# Patient Record
Sex: MALE | Race: WHITE
[De-identification: names, ages, dates, MRNs, and addresses within clinical notes are randomized per-mention and may not be internally consistent; named-entity substitution may affect disease eponyms.]

---

## 2017-10-22 ENCOUNTER — HOSPITAL ENCOUNTER (INPATIENT)
Dept: HOSPITAL 80 - F1N | Age: 57
LOS: 1 days | Discharge: LEFT BEFORE DISCHARGE | DRG: 848 | End: 2017-10-23
Attending: INTERNAL MEDICINE | Admitting: INTERNAL MEDICINE
Payer: MEDICAID

## 2017-10-22 DIAGNOSIS — Z51.11: Primary | ICD-10-CM

## 2017-10-23 ENCOUNTER — HOSPITAL ENCOUNTER (INPATIENT)
Dept: HOSPITAL 80 - F1N | Age: 57
LOS: 6 days | Discharge: HOME | DRG: 847 | End: 2017-10-29
Attending: INTERNAL MEDICINE | Admitting: HOSPITALIST
Payer: COMMERCIAL

## 2017-10-23 DIAGNOSIS — G89.29: ICD-10-CM

## 2017-10-23 DIAGNOSIS — G62.9: ICD-10-CM

## 2017-10-23 DIAGNOSIS — Z88.0: ICD-10-CM

## 2017-10-23 DIAGNOSIS — G47.00: ICD-10-CM

## 2017-10-23 DIAGNOSIS — C21.0: ICD-10-CM

## 2017-10-23 DIAGNOSIS — Z51.11: Primary | ICD-10-CM

## 2017-10-23 DIAGNOSIS — Z59.0: ICD-10-CM

## 2017-10-23 DIAGNOSIS — F10.10: ICD-10-CM

## 2017-10-23 DIAGNOSIS — R74.0: ICD-10-CM

## 2017-10-23 DIAGNOSIS — Z87.820: ICD-10-CM

## 2017-10-23 DIAGNOSIS — F17.210: ICD-10-CM

## 2017-10-23 DIAGNOSIS — I10: ICD-10-CM

## 2017-10-23 DIAGNOSIS — Z91.19: ICD-10-CM

## 2017-10-23 LAB
ALBUMIN SERPL-MCNC: 4.2 G/DL (ref 3.5–5)
ALP SERPL-CCNC: 62 IU/L (ref 38–126)
ALT SERPL-CCNC: 88 IU/L (ref 21–72)
ANION GAP SERPL CALC-SCNC: 13 MEQ/L (ref 8–16)
AST SERPL-CCNC: 88 IU/L (ref 17–59)
BILIRUB SERPL-MCNC: 0.5 MG/DL (ref 0.1–1.4)
CALCIUM SERPL-MCNC: 8.7 MG/DL (ref 8.5–10.4)
CHLORIDE SERPL-SCNC: 102 MEQ/L (ref 97–110)
CO2 SERPL-SCNC: 26 MEQ/L (ref 22–31)
CREAT SERPL-MCNC: 0.9 MG/DL (ref 0.7–1.3)
ETHANOL SERPL-MCNC: 278 MG/DL (ref 0–10)
GFR SERPL CREATININE-BSD FRML MDRD: > 60 ML/MIN/{1.73_M2}
GLUCOSE SERPL-MCNC: 106 MG/DL (ref 70–100)
INR PPP: 1.02 (ref 0.83–1.16)
POTASSIUM SERPL-SCNC: 3.3 MEQ/L (ref 3.5–5.2)
PROT SERPL-MCNC: 7.1 G/DL (ref 6.3–8.2)
PROTHROMBIN TIME: 13.3 SEC (ref 12–15)
SODIUM SERPL-SCNC: 141 MEQ/L (ref 134–144)

## 2017-10-23 PROCEDURE — C1751 CATH, INF, PER/CENT/MIDLINE: HCPCS

## 2017-10-23 PROCEDURE — G0480 DRUG TEST DEF 1-7 CLASSES: HCPCS

## 2017-10-23 PROCEDURE — 3E03305 INTRODUCTION OF OTHER ANTINEOPLASTIC INTO PERIPHERAL VEIN, PERCUTANEOUS APPROACH: ICD-10-PCS | Performed by: INTERNAL MEDICINE

## 2017-10-23 SDOH — ECONOMIC STABILITY - HOUSING INSECURITY: HOMELESSNESS: Z59.0

## 2017-10-23 NOTE — PDGENHP
History and Physical





- Chief Complaint


here for chemo





- History of Present Illness


56 yo male with h/o metastatic anal cancer presents to hospital for direct 

admission for chemotherapy.  He initially noticed a right groin lump in 3/2017.

  At that time he was incarcerated.  He ultimately underwent biopsy of this node

, which showed squamous cell carcinoma possibly consistent with anal cancer 

origin.  At PET/CT done at [facility] showed increased uptake along the right wall of 

the anus as well as the right inguinal lymph region.  He is admitted for PICC 

line placement and initiation of chemotherapy.  He denies CP, SOB, abdominal 

pain, N/V or diarrhea.  No fevers or chills.  He drinks a pint of whiskey a day

, but denies any h/o withdrawal syndrome or seizures.  He appears substance 

affected during my interview.  He initially presented to the med surg unit 

earlier today, then left AMA for 3-4 hrs and just returned.  He now agrees to 

stay for the duration of his chemo course.  He is emotional regarding his 

diagnosis. 





History Information





- Allergies/Home Medication List


Allergies/Adverse Reactions: 








Penicillins Allergy (Intermediate, Verified 10/29/16 23:24)


 Rash





Home Medications: 








Lidocaine 5% [Lidoderm 5% Patch (*)] 1 ea TD DAILY 08/11/15 [Last Taken 10/23/17

]


Terazosin HCl [Hytrin 2 MG (*)] 2 mg PO HS 08/11/15 [Last Taken 10/22/17]


traMADol [Ultram 50 mg (*)] 50 - 100 mg PO Q8 PRN 08/11/15 [Last Taken Unknown]


traZODone [traZODONE 100MG (*)] 200 mg PO HS 08/11/15 [Last Taken 10/22/17]


Baclofen [Baclofen 10 mg (*)] 10 mg PO HS 10/23/17 [Last Taken 10/22/17]


Fluticasone Nasal [Flonase Nasal Spray (RX)] 2 sprays NASAL DAILY 10/23/17 [

Last Taken 10/23/17]


Gabapentin [Neurontin 400 MG (*)] 800 mg PO TID 10/23/17 [Last Taken 10/23/17]


Ibuprofen [Motrin (*)] 600 mg PO TIDMEAL 10/23/17 [Last Taken 10/23/17]


Losartan Potassium [Cozaar 50 mg (*)] 50 mg PO DAILY 10/23/17 [Last Taken 10/23/

17]


Omeprazole [Prilosec 20 mg] 40 mg PO DAILY 10/23/17 [Last Taken 10/23/17]


oxyCODONE IR [Oxycodone Ir (*)] 5 mg PO BID PRN 10/23/17 [Last Taken Unknown]





I have personally reviewed and updated: family history, medical history, social 

history, surgical history





- Past Medical History


Additional medical history: alcohol abuse, tobacco abuse, hypertension, 

neuropathy, traumatic ICH 2009, insomnia





- Surgical History


Additional surgical history: right shoulder surgery 1986.  metal plate right 

finger





- Family History


Positive for: non-pertinent





- Social History


Smoking Status: Current every day smoker


Tobacco Use: Greater than 1 pack/day


Alcohol Use: Heavy


Additional social history: Homeless.  Drinks pint of YOOSEey a day.  

Denies w/d or seizure hx.





Review of Systems


Review of Systems: 





ROS: 10pt was reviewed & negative except for what was stated in HPI & below





Physical Exam


Physical Exam: 

















Temp Pulse Resp BP Pulse Ox


 


 36.4 C   104 H  20   130/81 H  94 


 


 10/23/17 18:47  10/23/17 18:47  10/23/17 18:47  10/23/17 18:47  10/23/17 18:47











Constitutional: no apparent distress


Eyes: PERRL


Ears, Nose, Mouth, Throat: moist mucous membranes


Cardiovascular: regular rate and rhythym, no murmur, rub, or gallop


Respiratory: no respiratory distress, clear to auscultation


Gastrointestinal: normoactive bowel sounds, soft, non-tender abdomen


Genitourinary: other (right groin LAD with palpable node ~2x3 cm)


Skin: warm


Musculoskeletal: full muscle strength


Psychiatric: poor judgement





Lab Data & Imaging Review





 10/23/17 20:30





 10/23/17 20:30





Assessment & Plan


Assessment: 





56 yo male admitted for chemo 





Metastatic anal cancer - SCC on biopsy of right groin node, suspected source is 

anal thickening seen on PET/CT.  Plan is admitted for chemo, followed by Dr. Gómez at [facility].  Discussed with pt reluctance to place a PICC given he 

already left AMA once today.  He agrees to stay for duration of chemo course, 

but I note he appears substance affected at this time. 


   -defer PICC until tomorrow AM, need to consent pt to this when his mentation 

is more clear


   -chemo per oncology





Alcohol use disorder - po vodka ordered to avoid withdrawal syndrome in this 

patient who does not intend to maintain sobriety.





Tobacco use disorder - pt declined nicoderm patch





Full code





DVT PPLX - Lovenox





Dispo - inpt, will require >48 hrs hospitalization for chemo

## 2017-10-24 LAB
% IMMATURE GRANULYOCYTES: 0.2 % (ref 0–1.1)
ABSOLUTE IMMATURE GRANULOCYTES: 0.01 10^3/UL (ref 0–0.1)
ABSOLUTE NRBC COUNT: 0 10^3/UL (ref 0–0.01)
ADD DIFF?: NO
ADD MORPH?: NO
ADD SCAN?: NO
ATYPICAL LYMPHOCYTE FLAG: 30 (ref 0–99)
ERYTHROCYTE [DISTWIDTH] IN BLOOD BY AUTOMATED COUNT: 13.2 % (ref 11.5–15.2)
FRAGMENT RBC FLAG: 0 (ref 0–99)
HCT VFR BLD CALC: 40.5 % (ref 40–51)
HGB BLD-MCNC: 14.4 G/DL (ref 13.7–17.5)
LEFT SHIFT FLG: 0 (ref 0–99)
LIPEMIA HEMOLYSIS FLAG: 90 (ref 0–99)
MCH RBC BLDCO QN: 35.5 PG (ref 27.9–34.1)
MCHC RBC AUTO-ENTMCNC: 35.6 G/DL (ref 32.4–36.7)
MCV RBC AUTO: 99.8 FL (ref 81.5–99.8)
NRBC-AUTO%: 0 % (ref 0–0.2)
PHENCYCLIDINE URINE BCH: < 6 NG/ML
PLATELET # BLD: 128 10^3/UL (ref 150–400)
PLATELET CLUMPS FLAG: 20 (ref 0–99)
PMV BLD AUTO: 9.6 FL (ref 8.7–11.7)
RBC # BLD AUTO: 4.06 10^6/UL (ref 4.4–6.38)
TETRAHYDROCANNABINOL URINE: NEGATIVE NG/ML

## 2017-10-24 PROCEDURE — 02HV33Z INSERTION OF INFUSION DEVICE INTO SUPERIOR VENA CAVA, PERCUTANEOUS APPROACH: ICD-10-PCS | Performed by: RADIOLOGY

## 2017-10-24 RX ADMIN — Medication SCH MG: at 09:36

## 2017-10-24 RX ADMIN — GABAPENTIN SCH MG: 400 CAPSULE ORAL at 12:00

## 2017-10-24 RX ADMIN — BACLOFEN PRN MG: 10 TABLET ORAL at 09:39

## 2017-10-24 RX ADMIN — OXYCODONE HYDROCHLORIDE PRN MG: 15 TABLET ORAL at 09:36

## 2017-10-24 RX ADMIN — TERAZOSIN HYDROCHLORIDE ANHYDROUS SCH MG: 2 CAPSULE ORAL at 21:08

## 2017-10-24 RX ADMIN — PANTOPRAZOLE SODIUM SCH MG: 40 TABLET, DELAYED RELEASE ORAL at 21:08

## 2017-10-24 RX ADMIN — Medication SCH ML: at 18:04

## 2017-10-24 RX ADMIN — LIDOCAINE SCH EA: 50 PATCH TOPICAL at 21:08

## 2017-10-24 RX ADMIN — BACLOFEN SCH MG: 10 TABLET ORAL at 21:09

## 2017-10-24 RX ADMIN — LOSARTAN POTASSIUM SCH MG: 50 TABLET, FILM COATED ORAL at 12:02

## 2017-10-24 RX ADMIN — FLUTICASONE PROPIONATE SCH: 50 SPRAY, METERED NASAL at 08:32

## 2017-10-24 RX ADMIN — GABAPENTIN SCH MG: 400 CAPSULE ORAL at 21:09

## 2017-10-24 NOTE — HOSPPROG
Hospitalist Progress Note


Assessment/Plan: 





# anal SCC 


   - plan mitomycin, then 4 days of 5-FU


   - XRT daily - patient very resistant to taking an ambulance; I called risk 

management


   - PICC today


# etOH abuse 


   - vodka daily, no plans for cessation


   - thiamine


# tobacco use - declined nicotine patch


# htn - losartan, terazosin


# neuropathy - gabapentin


# chronic pain - baclofen, oxy


# hx traumatic ICH 2009


# lovenox





Subjective: very resistant to taking an ambulance to radiation; sleeping, 

complains of awakening in the middle of the night


Objective: 


 Vital Signs











Temp Pulse Resp BP Pulse Ox


 


 36.8 C   92   16   131/84 H  95 


 


 10/23/17 20:00  10/23/17 20:00  10/23/17 20:00  10/23/17 20:00  10/23/17 20:00








 Laboratory Results





 10/24/17 04:45 





 10/23/17 20:30 





 











 10/23/17 10/24/17 10/25/17





 05:59 05:59 05:59


 


Intake Total  400 


 


Output Total  400 


 


Balance  0 








 











PT  13.3 SEC (12.0-15.0)   10/23/17  20:30    


 


INR  1.02  (0.83-1.16)   10/23/17  20:30    








chart reviewed


PET CT reviewed





- Physical Exam


Constitutional: unkempt


Cardiovascular: regular rate and rhythym, no murmur, rub, or gallop


Respiratory: no respiratory distress, no rales or rhonchi, clear to auscultation


Gastrointestinal: normoactive bowel sounds, soft, non-tender abdomen, no 

palpable masses





ICD10 Worksheet


Patient Problems: 


 Problems











Problem Status Onset


 


Cellulitis and abscess Acute

## 2017-10-24 NOTE — ASMTCMCOM
CM Note

 

CM Note                       

Notes:

Patient twice admitted yesterday after deciding to leave shortly after admission. Patient here for 

chemo and radiation for rectal cancer. Patient to get PICC today. Left message for Brianne Naranjo to 

see patient to establish a base line of behavior or necessary boundaries. Patient is refusing 

nicotine patch and also states he plans to continue drinking. Case management will continue to 

follow.

 

Date Signed:  10/24/2017 10:07 AM

Electronically Signed By:JOSE RAMON Davidson

## 2017-10-24 NOTE — ASMTCMCOM
CM Note

 

CM Note                       

Notes:

Requested that Martha Barrios meet with patient because he was very concerned that he would not 

receive assistance with housing after his treatment though he had been told that this would be 

arranged. Martha spent a considerable amount to time with patient and patient appeared to trust her 

with some very personal information. He also referred to important people in his life Francesca , his 

sister [phone], Brother Zak [phone], and friend Alex [phone]. He also told Martha he woul 

be willing to get a nicotin patch while he is here and said he would take the vodka if it is in 

cranberry juice. Martha plans to continue to touch base with this patient and address needs not 

anticipated as they arise. Case management will continue to follow. 

 

Date Signed:  10/24/2017 06:04 PM

Electronically Signed By:JOSE RAMON Davidson

## 2017-10-25 RX ADMIN — LOSARTAN POTASSIUM SCH MG: 50 TABLET, FILM COATED ORAL at 12:02

## 2017-10-25 RX ADMIN — BACLOFEN PRN MG: 10 TABLET ORAL at 05:52

## 2017-10-25 RX ADMIN — BACLOFEN SCH MG: 10 TABLET ORAL at 22:37

## 2017-10-25 RX ADMIN — OXYCODONE HYDROCHLORIDE PRN MG: 15 TABLET ORAL at 08:48

## 2017-10-25 RX ADMIN — Medication SCH ML: at 08:48

## 2017-10-25 RX ADMIN — PANTOPRAZOLE SODIUM SCH MG: 40 TABLET, DELAYED RELEASE ORAL at 21:07

## 2017-10-25 RX ADMIN — GABAPENTIN SCH MG: 400 CAPSULE ORAL at 21:08

## 2017-10-25 RX ADMIN — GABAPENTIN SCH MG: 400 CAPSULE ORAL at 12:03

## 2017-10-25 RX ADMIN — LIDOCAINE SCH EA: 50 PATCH TOPICAL at 21:07

## 2017-10-25 RX ADMIN — TERAZOSIN HYDROCHLORIDE ANHYDROUS SCH MG: 2 CAPSULE ORAL at 22:37

## 2017-10-25 RX ADMIN — FLUTICASONE PROPIONATE SCH: 50 SPRAY, METERED NASAL at 10:17

## 2017-10-25 RX ADMIN — Medication SCH MG: at 08:48

## 2017-10-25 RX ADMIN — ENOXAPARIN SODIUM SCH MG: 100 INJECTION SUBCUTANEOUS at 08:52

## 2017-10-25 RX ADMIN — GABAPENTIN SCH MG: 400 CAPSULE ORAL at 05:50

## 2017-10-25 NOTE — HOSPPROG
Hospitalist Progress Note


Assessment/Plan: 








# anal SCC 


   - plan mitomycin, then 4 days of 5-FU - likely complete Sunday am


   - XRT daily - ok'd for him to take wheelchair


   - PICC today


# etOH abuse - no evidence of w/d


   - vodka daily, no plans for cessation


   - ativan prn


   - thiamine


# tobacco use - nicotine prn


# htn - losartan, terazosin, start low dose norvasc


# neuropathy - gabapentin


# chronic pain - baclofen, oxy


# hx traumatic ICH 2009


# lovenox





Subjective: s/p PICC and XRT yesterday


Objective: 


 Vital Signs











Temp Pulse Resp BP Pulse Ox


 


 36.4 C   91   20   166/105 H  93 


 


 10/25/17 09:35  10/25/17 09:35  10/25/17 09:35  10/25/17 09:35  10/25/17 09:35








 Laboratory Results





 10/24/17 04:45 





 10/23/17 20:30 





 











 10/24/17 10/25/17 10/26/17





 05:59 05:59 05:59


 


Intake Total 400 200 


 


Output Total 400  


 


Balance 0 200 








 











PT  13.3 SEC (12.0-15.0)   10/23/17  20:30    


 


INR  1.02  (0.83-1.16)   10/23/17  20:30    














- Physical Exam


Constitutional: no apparent distress, appears nourished


Cardiovascular: regular rate and rhythym, no murmur, rub, or gallop, systolic 

murmur


Respiratory: no respiratory distress, no rales or rhonchi, clear to auscultation


Gastrointestinal: normoactive bowel sounds, soft, non-tender abdomen, no 

palpable masses





ICD10 Worksheet


Patient Problems: 


 Problems











Problem Status Onset


 


Cellulitis and abscess Acute

## 2017-10-25 NOTE — ASMTCMCOM
CM Note

 

CM Note                       

Notes:

ULTC-100 started, at front of chart. CM will finish in AM. Ines notified for LT M'Caid katlin to be 

atarted as pt may need SNF placement at [place].

 

Date Signed:  10/25/2017 05:02 PM

Electronically Signed By:Alejandra Thomason RN

## 2017-10-25 NOTE — SOAPPROG
SOAP Progress Note


Assessment/Plan: 


Assessment:


1) Stage IIIB anal canal SCC


2) Alcohol abuse


3) Mild transaminitis





























Plan:


Ray had a PICC line placed yesterday. He is has started Mitomycin / 5-FU 

yesterday. So far he has had no side effects.


He continues on daily XRT.


Will repeat a CMP in am tomorrow.


Case management arranging housing once he is discharged.


His questions were answered.





10/25/17 14:21





Subjective: 





Feels well. Denies N/V.


Denies diarrhea or mouth sores.


Objective: 





 Vital Signs











Temp Pulse Resp BP Pulse Ox


 


 36.4 C   107 H  14   136/104 H  93 


 


 10/25/17 09:35  10/25/17 12:01  10/25/17 12:01  10/25/17 12:01  10/25/17 12:01








 Laboratory Results





 10/24/17 04:45 





 10/23/17 20:30 





 











 10/24/17 10/25/17 10/26/17





 05:59 05:59 05:59


 


Intake Total 400 200 


 


Output Total 400  


 


Balance 0 200 








 











PT  13.3 SEC (12.0-15.0)   10/23/17  20:30    


 


INR  1.02  (0.83-1.16)   10/23/17  20:30    














- Time Spent With Patient


Time Spent With Patient: 





20 minutes





Physical Exam





- Physical Exam


General Appearance: alert, no apparent distress


EENT: PERRL/EOMI


Skin: normal color


Neuro/Psych: normal mood/affect





ICD10 Worksheet


Patient Problems: 


 Problems











Problem Status Onset


 


Cellulitis and abscess Acute

## 2017-10-26 LAB
% IMMATURE GRANULYOCYTES: 0.2 % (ref 0–1.1)
ABSOLUTE IMMATURE GRANULOCYTES: 0.01 10^3/UL (ref 0–0.1)
ABSOLUTE NRBC COUNT: 0 10^3/UL (ref 0–0.01)
ADD DIFF?: NO
ADD MORPH?: NO
ADD SCAN?: NO
ALBUMIN SERPL-MCNC: 3.9 G/DL (ref 3.5–5)
ALP SERPL-CCNC: 56 IU/L (ref 38–126)
ALT SERPL-CCNC: 63 IU/L (ref 21–72)
ANION GAP SERPL CALC-SCNC: 11 MEQ/L (ref 8–16)
AST SERPL-CCNC: 51 IU/L (ref 17–59)
ATYPICAL LYMPHOCYTE FLAG: 0 (ref 0–99)
BILIRUB SERPL-MCNC: 1.3 MG/DL (ref 0.1–1.4)
CALCIUM SERPL-MCNC: 9.1 MG/DL (ref 8.5–10.4)
CHLORIDE SERPL-SCNC: 101 MEQ/L (ref 97–110)
CO2 SERPL-SCNC: 29 MEQ/L (ref 22–31)
CREAT SERPL-MCNC: 0.8 MG/DL (ref 0.7–1.3)
ERYTHROCYTE [DISTWIDTH] IN BLOOD BY AUTOMATED COUNT: 12.8 % (ref 11.5–15.2)
FRAGMENT RBC FLAG: 0 (ref 0–99)
GFR SERPL CREATININE-BSD FRML MDRD: > 60 ML/MIN/{1.73_M2}
GLUCOSE SERPL-MCNC: 81 MG/DL (ref 70–100)
HCT VFR BLD CALC: 41.3 % (ref 40–51)
HGB BLD-MCNC: 14.6 G/DL (ref 13.7–17.5)
LEFT SHIFT FLG: 0 (ref 0–99)
LIPEMIA HEMOLYSIS FLAG: 90 (ref 0–99)
MCH RBC BLDCO QN: 36.1 PG (ref 27.9–34.1)
MCHC RBC AUTO-ENTMCNC: 35.4 G/DL (ref 32.4–36.7)
MCV RBC AUTO: 102.2 FL (ref 81.5–99.8)
NRBC-AUTO%: 0 % (ref 0–0.2)
PLATELET # BLD: 124 10^3/UL (ref 150–400)
PLATELET CLUMPS FLAG: 10 (ref 0–99)
PMV BLD AUTO: 9.7 FL (ref 8.7–11.7)
POTASSIUM SERPL-SCNC: 4 MEQ/L (ref 3.5–5.2)
PROT SERPL-MCNC: 7.3 G/DL (ref 6.3–8.2)
RBC # BLD AUTO: 4.04 10^6/UL (ref 4.4–6.38)
SODIUM SERPL-SCNC: 141 MEQ/L (ref 134–144)

## 2017-10-26 RX ADMIN — ENOXAPARIN SODIUM SCH MG: 100 INJECTION SUBCUTANEOUS at 07:48

## 2017-10-26 RX ADMIN — Medication SCH ML: at 07:48

## 2017-10-26 RX ADMIN — GABAPENTIN SCH MG: 400 CAPSULE ORAL at 12:27

## 2017-10-26 RX ADMIN — Medication SCH MG: at 07:48

## 2017-10-26 RX ADMIN — GABAPENTIN SCH MG: 400 CAPSULE ORAL at 21:31

## 2017-10-26 RX ADMIN — TERAZOSIN HYDROCHLORIDE ANHYDROUS SCH MG: 2 CAPSULE ORAL at 21:33

## 2017-10-26 RX ADMIN — PANTOPRAZOLE SODIUM SCH MG: 40 TABLET, DELAYED RELEASE ORAL at 21:34

## 2017-10-26 RX ADMIN — BACLOFEN SCH MG: 10 TABLET ORAL at 21:35

## 2017-10-26 RX ADMIN — GABAPENTIN SCH MG: 400 CAPSULE ORAL at 05:37

## 2017-10-26 RX ADMIN — BACLOFEN PRN MG: 10 TABLET ORAL at 14:00

## 2017-10-26 RX ADMIN — LOSARTAN POTASSIUM SCH MG: 50 TABLET, FILM COATED ORAL at 12:27

## 2017-10-26 RX ADMIN — LIDOCAINE SCH EA: 50 PATCH TOPICAL at 21:31

## 2017-10-26 RX ADMIN — FLUTICASONE PROPIONATE SCH: 50 SPRAY, METERED NASAL at 07:50

## 2017-10-26 RX ADMIN — Medication SCH MLS: at 00:00

## 2017-10-26 NOTE — SOAPPROG
SOAP Progress Note


Assessment/Plan: 


Assessment:


1) Stage IIIB anal canal SCC


2) Alcohol abuse


3) Mild transaminitis





























Plan:


Ray had a PICC line. He is has started Mitomycin / 5-FU on 10/24. So far he 

has had no side effects.


He continues on daily XRT.


Will repeat a CMP today when his 5-FU is stopped for him to go to XRT.


No signs of ETOH withdrawal. He receives vodka as he is not interested in 

sobriety.


Case management arranging housing once he is discharged.


His questions were answered.





10/25/17 14:21





10/26/17 08:48





10/26/17 08:49





Subjective: 





Receiving chemotherapy and daily XRT.


Denies nausea or diarrhea


Objective: 





 Vital Signs











Temp Pulse Resp BP Pulse Ox


 


 36.4 C   79   16   152/107 H  95 


 


 10/26/17 07:26  10/26/17 07:26  10/26/17 07:26  10/26/17 07:47  10/26/17 07:26








 Laboratory Results





 10/24/17 04:45 





 10/23/17 20:30 





 











 10/25/17 10/26/17 10/27/17





 05:59 05:59 05:59


 


Intake Total 200 1832.90 


 


Balance 200 1832.90 








 











PT  13.3 SEC (12.0-15.0)   10/23/17  20:30    


 


INR  1.02  (0.83-1.16)   10/23/17  20:30    














- Time Spent With Patient


Time Spent With Patient: 





20 minutes





Physical Exam





- Physical Exam


General Appearance: alert, no apparent distress


EENT: PERRL/EOMI


Respiratory: lungs clear


Cardiac/Chest: regular rate, rhythm


Abdomen: non-tender, soft


Skin: normal color


Neuro/Psych: alert





ICD10 Worksheet


Patient Problems: 


 Problems











Problem Status Onset


 


Cellulitis and abscess Acute

## 2017-10-26 NOTE — HOSPPROG
Hospitalist Progress Note


Assessment/Plan: 








# anal SCC, admitted for chemo


   - plan mitomycin, then 4 days of 5-FU - likely complete Sunday am


   - XRT daily - ok'd for him to take wheelchair


   - PICC today


# etOH abuse - no evidence of w/d


   - vodka daily, no plans for cessation


   - ativan prn


   - thiamine


# behavioral issues - very resistant to following hospital protocols; makes his 

treatment difficult; had initially signed out AMA, then returned intoxicated


# tobacco use - nicotine prn


# htn - losartan, terazosin, start low dose norvasc


# neuropathy - gabapentin


# chronic pain - baclofen, oxy


# hx traumatic ICH 2009


# lovenox





Subjective: still resistant to following rules such as staying on the unit 

while he is getting chemo


Objective: 


 Vital Signs











Temp Pulse Resp BP Pulse Ox


 


 36.4 C   79   16   152/107 H  95 


 


 10/26/17 07:26  10/26/17 07:26  10/26/17 07:26  10/26/17 07:47  10/26/17 07:26








 Laboratory Results





 10/26/17 09:20 





 10/26/17 09:20 





 











 10/25/17 10/26/17 10/27/17





 05:59 05:59 05:59


 


Intake Total 200 1832.90 


 


Balance 200 1832.90 








 











PT  13.3 SEC (12.0-15.0)   10/23/17  20:30    


 


INR  1.02  (0.83-1.16)   10/23/17  20:30    














- Time Spent With Patient


Time Spent with Patient: greater than 25 minutes


Time Spent with Patient: Greater than 25 minutes spent on this patients care, 

greater than 50% of time spent counseling, educating, and coordinating care 

regarding the above mentioned plan.





- Physical Exam


Constitutional: no apparent distress, appears nourished





ICD10 Worksheet


Patient Problems: 


 Problems











Problem Status Onset


 


Cellulitis and abscess Acute

## 2017-10-27 LAB
% IMMATURE GRANULYOCYTES: 0.2 % (ref 0–1.1)
ABSOLUTE IMMATURE GRANULOCYTES: 0.01 10^3/UL (ref 0–0.1)
ABSOLUTE NRBC COUNT: 0 10^3/UL (ref 0–0.01)
ADD DIFF?: NO
ADD MORPH?: NO
ADD SCAN?: NO
ALBUMIN SERPL-MCNC: 4 G/DL (ref 3.5–5)
ALP SERPL-CCNC: 55 IU/L (ref 38–126)
ALT SERPL-CCNC: 58 IU/L (ref 21–72)
ANION GAP SERPL CALC-SCNC: 13 MEQ/L (ref 8–16)
AST SERPL-CCNC: 50 IU/L (ref 17–59)
ATYPICAL LYMPHOCYTE FLAG: 0 (ref 0–99)
BILIRUB SERPL-MCNC: 1.3 MG/DL (ref 0.1–1.4)
CALCIUM SERPL-MCNC: 9 MG/DL (ref 8.5–10.4)
CHLORIDE SERPL-SCNC: 100 MEQ/L (ref 97–110)
CO2 SERPL-SCNC: 26 MEQ/L (ref 22–31)
CREAT SERPL-MCNC: 0.8 MG/DL (ref 0.7–1.3)
ERYTHROCYTE [DISTWIDTH] IN BLOOD BY AUTOMATED COUNT: 12.9 % (ref 11.5–15.2)
FRAGMENT RBC FLAG: 0 (ref 0–99)
GFR SERPL CREATININE-BSD FRML MDRD: > 60 ML/MIN/{1.73_M2}
GLUCOSE SERPL-MCNC: 99 MG/DL (ref 70–100)
HCT VFR BLD CALC: 39.9 % (ref 40–51)
HGB BLD-MCNC: 14.4 G/DL (ref 13.7–17.5)
LEFT SHIFT FLG: 0 (ref 0–99)
LIPEMIA HEMOLYSIS FLAG: 90 (ref 0–99)
MCH RBC BLDCO QN: 36.9 PG (ref 27.9–34.1)
MCHC RBC AUTO-ENTMCNC: 36.1 G/DL (ref 32.4–36.7)
MCV RBC AUTO: 102.3 FL (ref 81.5–99.8)
NRBC-AUTO%: 0 % (ref 0–0.2)
PLATELET # BLD: 122 10^3/UL (ref 150–400)
PLATELET CLUMPS FLAG: 0 (ref 0–99)
PMV BLD AUTO: 9.2 FL (ref 8.7–11.7)
POTASSIUM SERPL-SCNC: 3.6 MEQ/L (ref 3.5–5.2)
PROT SERPL-MCNC: 6.8 G/DL (ref 6.3–8.2)
RBC # BLD AUTO: 3.9 10^6/UL (ref 4.4–6.38)
SODIUM SERPL-SCNC: 139 MEQ/L (ref 134–144)

## 2017-10-27 RX ADMIN — BACLOFEN SCH MG: 10 TABLET ORAL at 21:17

## 2017-10-27 RX ADMIN — GABAPENTIN SCH MG: 400 CAPSULE ORAL at 21:17

## 2017-10-27 RX ADMIN — OXYCODONE HYDROCHLORIDE PRN MG: 15 TABLET ORAL at 05:08

## 2017-10-27 RX ADMIN — Medication SCH MG: at 08:57

## 2017-10-27 RX ADMIN — GABAPENTIN SCH MG: 400 CAPSULE ORAL at 05:06

## 2017-10-27 RX ADMIN — TERAZOSIN HYDROCHLORIDE ANHYDROUS SCH MG: 2 CAPSULE ORAL at 21:17

## 2017-10-27 RX ADMIN — FLUTICASONE PROPIONATE SCH: 50 SPRAY, METERED NASAL at 09:01

## 2017-10-27 RX ADMIN — BACLOFEN PRN MG: 10 TABLET ORAL at 08:58

## 2017-10-27 RX ADMIN — Medication SCH MLS: at 02:46

## 2017-10-27 RX ADMIN — ENOXAPARIN SODIUM SCH MG: 100 INJECTION SUBCUTANEOUS at 08:57

## 2017-10-27 RX ADMIN — Medication SCH ML: at 08:57

## 2017-10-27 RX ADMIN — LOSARTAN POTASSIUM SCH MG: 50 TABLET, FILM COATED ORAL at 11:57

## 2017-10-27 RX ADMIN — OXYCODONE HYDROCHLORIDE PRN MG: 15 TABLET ORAL at 14:18

## 2017-10-27 RX ADMIN — PANTOPRAZOLE SODIUM SCH MG: 40 TABLET, DELAYED RELEASE ORAL at 21:18

## 2017-10-27 RX ADMIN — OXYCODONE HYDROCHLORIDE PRN MG: 15 TABLET ORAL at 19:23

## 2017-10-27 RX ADMIN — LIDOCAINE SCH EA: 50 PATCH TOPICAL at 21:18

## 2017-10-27 RX ADMIN — OXYCODONE HYDROCHLORIDE PRN MG: 15 TABLET ORAL at 08:58

## 2017-10-27 RX ADMIN — GABAPENTIN SCH MG: 400 CAPSULE ORAL at 11:55

## 2017-10-27 NOTE — ASMTCMCOM
CM Note

 

CM Note                       

Notes:

Contacted [facility] today and they contacted [employer] for pt. MOW will start on 

Tuesday 10/31 so [facility] will drop off food supplies at the [employer] [facility] Monday for pt . Pt signed 

the respite care agreement which was faxed to [facility] and placed in pt's chart. Transportation for 


pt's appts at [facility] made through [employer] starting 10/30 through 11/10. Kelly Patrick will continue 

transportation arrangements after that. C/M will continue to follow.

 

Date Signed:  10/27/2017 03:27 PM

Electronically Signed By:Elana De La Cruz LCSW

## 2017-10-27 NOTE — HOSPPROG
Hospitalist Progress Note


Assessment/Plan: 








# anal SCC, admitted for chemo


   - plan mitomycin, then 4 days of 5-FU - likely complete Sunday am; could be 

discharged after


   - XRT daily - ok'd for him to take wheelchair


   - PICC - would remove on discharge


# etOH abuse - no evidence of w/d


   - vodka daily, no plans for cessation


   - ativan prn


   - thiamine


# behavioral issues - very resistant to following hospital protocols; makes his 

treatment difficult; had initially signed out AMA, then returned intoxicated


   - appreciate Brianne Naranjo's note


# tobacco use - nicotine prn


# htn - losartan, terazosin, start low dose norvasc with elevated BPs here


# neuropathy - gabapentin


# chronic pain - baclofen, oxy


# hx traumatic ICH 2009


# lovenox





Subjective: feels that the BP cufs are incorrect


Objective: 


 Vital Signs











Temp Pulse Resp BP Pulse Ox


 


 36.6 C   108 H  19   142/82 H  93 


 


 10/27/17 08:41  10/27/17 08:41  10/27/17 08:41  10/27/17 11:57  10/27/17 08:41








 Laboratory Results





 10/27/17 03:09 





 10/27/17 03:09 





 











 10/26/17 10/27/17 10/28/17





 05:59 05:59 05:59


 


Intake Total 1832.90 1791 


 


Balance 1832.90 1791 








 











PT  13.3 SEC (12.0-15.0)   10/23/17  20:30    


 


INR  1.02  (0.83-1.16)   10/23/17  20:30    














- Time Spent With Patient


Time Spent with Patient: greater than 25 minutes


Time Spent with Patient: Greater than 25 minutes spent on this patients care, 

greater than 50% of time spent counseling, educating, and coordinating care 

regarding the above mentioned plan.





- Physical Exam


Constitutional: no apparent distress, appears nourished





ICD10 Worksheet


Patient Problems: 


 Problems











Problem Status Onset


 


Cellulitis and abscess Acute

## 2017-10-27 NOTE — SOAPPROG
SOAP Progress Note


Assessment/Plan: 


Assessment:


1) Stage IIIB anal canal SCC


2) Alcohol abuse


3) Mild transaminitis--resolved





























Plan:


Ray had a PICC line. He started Mitomycin / 5-FU on 10/24 in the evening. 

So far he has had no side effects.


He will complete his therapy early Sunday morning.


He continues on daily XRT.


His repeat CMP is normal with no evidence of transaminitis.


No signs of ETOH withdrawal. He receives vodka as he is not interested in 

sobriety.


Case management arranging housing once he is discharged.


His questions were answered.





10/27/17 14:39





Subjective: 





Overall feels well. Denies nausea / vomiting / diarrhea / Skin rash.


Objective: 





 Vital Signs











Temp Pulse Resp BP Pulse Ox


 


 36.6 C   108 H  19   142/82 H  93 


 


 10/27/17 08:41  10/27/17 08:41  10/27/17 08:41  10/27/17 11:57  10/27/17 08:41








 Laboratory Results





 10/27/17 03:09 





 10/27/17 03:09 





 











 10/26/17 10/27/17 10/28/17





 05:59 05:59 05:59


 


Intake Total 1832.90 1791 


 


Balance 1832.90 1791 








 











PT  13.3 SEC (12.0-15.0)   10/23/17  20:30    


 


INR  1.02  (0.83-1.16)   10/23/17  20:30    














- Time Spent With Patient


Time Spent With Patient: 





20 minutes





Physical Exam





- Physical Exam


General Appearance: alert, no apparent distress


EENT: PERRL/EOMI


Abdomen: soft


Skin: normal color


Neuro/Psych: alert





ICD10 Worksheet


Patient Problems: 


 Problems











Problem Status Onset


 


Cellulitis and abscess Acute

## 2017-10-28 LAB
% IMMATURE GRANULYOCYTES: 0.3 % (ref 0–1.1)
ABSOLUTE IMMATURE GRANULOCYTES: 0.01 10^3/UL (ref 0–0.1)
ABSOLUTE NRBC COUNT: 0 10^3/UL (ref 0–0.01)
ADD DIFF?: NO
ADD MORPH?: NO
ADD SCAN?: NO
ALBUMIN SERPL-MCNC: 3.8 G/DL (ref 3.5–5)
ALP SERPL-CCNC: 51 IU/L (ref 38–126)
ALT SERPL-CCNC: 59 IU/L (ref 21–72)
ANION GAP SERPL CALC-SCNC: 12 MEQ/L (ref 8–16)
AST SERPL-CCNC: 43 IU/L (ref 17–59)
ATYPICAL LYMPHOCYTE FLAG: 0 (ref 0–99)
BILIRUB SERPL-MCNC: 1.5 MG/DL (ref 0.1–1.4)
CALCIUM SERPL-MCNC: 9.2 MG/DL (ref 8.5–10.4)
CHLORIDE SERPL-SCNC: 101 MEQ/L (ref 97–110)
CO2 SERPL-SCNC: 29 MEQ/L (ref 22–31)
CREAT SERPL-MCNC: 0.8 MG/DL (ref 0.7–1.3)
ERYTHROCYTE [DISTWIDTH] IN BLOOD BY AUTOMATED COUNT: 12.6 % (ref 11.5–15.2)
FRAGMENT RBC FLAG: 0 (ref 0–99)
GFR SERPL CREATININE-BSD FRML MDRD: > 60 ML/MIN/{1.73_M2}
GLUCOSE SERPL-MCNC: 97 MG/DL (ref 70–100)
HCT VFR BLD CALC: 39.9 % (ref 40–51)
HGB BLD-MCNC: 14.5 G/DL (ref 13.7–17.5)
LEFT SHIFT FLG: 0 (ref 0–99)
LIPEMIA HEMOLYSIS FLAG: 90 (ref 0–99)
MCH RBC BLDCO QN: 37.2 PG (ref 27.9–34.1)
MCHC RBC AUTO-ENTMCNC: 36.3 G/DL (ref 32.4–36.7)
MCV RBC AUTO: 102.3 FL (ref 81.5–99.8)
NRBC-AUTO%: 0 % (ref 0–0.2)
PLATELET # BLD: 114 10^3/UL (ref 150–400)
PLATELET CLUMPS FLAG: 10 (ref 0–99)
PMV BLD AUTO: 9.3 FL (ref 8.7–11.7)
POTASSIUM SERPL-SCNC: 3.6 MEQ/L (ref 3.5–5.2)
PROT SERPL-MCNC: 7.1 G/DL (ref 6.3–8.2)
RBC # BLD AUTO: 3.9 10^6/UL (ref 4.4–6.38)
SODIUM SERPL-SCNC: 142 MEQ/L (ref 134–144)

## 2017-10-28 RX ADMIN — GABAPENTIN SCH MG: 400 CAPSULE ORAL at 05:50

## 2017-10-28 RX ADMIN — BACLOFEN PRN MG: 10 TABLET ORAL at 04:56

## 2017-10-28 RX ADMIN — FLUTICASONE PROPIONATE SCH: 50 SPRAY, METERED NASAL at 09:16

## 2017-10-28 RX ADMIN — LIDOCAINE SCH EA: 50 PATCH TOPICAL at 21:12

## 2017-10-28 RX ADMIN — BACLOFEN SCH MG: 10 TABLET ORAL at 21:10

## 2017-10-28 RX ADMIN — PANTOPRAZOLE SODIUM SCH MG: 40 TABLET, DELAYED RELEASE ORAL at 21:10

## 2017-10-28 RX ADMIN — TERAZOSIN HYDROCHLORIDE ANHYDROUS SCH: 2 CAPSULE ORAL at 21:11

## 2017-10-28 RX ADMIN — LOSARTAN POTASSIUM SCH MG: 50 TABLET, FILM COATED ORAL at 12:38

## 2017-10-28 RX ADMIN — GABAPENTIN SCH MG: 400 CAPSULE ORAL at 21:09

## 2017-10-28 RX ADMIN — OXYCODONE HYDROCHLORIDE PRN MG: 15 TABLET ORAL at 09:21

## 2017-10-28 RX ADMIN — BACLOFEN PRN MG: 10 TABLET ORAL at 14:21

## 2017-10-28 RX ADMIN — OXYCODONE HYDROCHLORIDE PRN MG: 15 TABLET ORAL at 21:09

## 2017-10-28 RX ADMIN — GABAPENTIN SCH MG: 400 CAPSULE ORAL at 12:38

## 2017-10-28 RX ADMIN — ENOXAPARIN SODIUM SCH MG: 100 INJECTION SUBCUTANEOUS at 09:15

## 2017-10-28 RX ADMIN — Medication SCH MLS: at 04:31

## 2017-10-28 RX ADMIN — Medication SCH MG: at 09:15

## 2017-10-28 RX ADMIN — Medication SCH ML: at 09:16

## 2017-10-28 NOTE — HOSPPROG
Hospitalist Progress Note


Assessment/Plan: 





 #  anal SCC, admitted for chemo


   - plan mitomycin, then 4 days of 5-FU - likely complete Sunday am; could be 

discharged after


   - XRT daily - ok'd for him to take wheelchair


   - PICC - would remove on discharge


# etOH abuse - no evidence of w/d


   - vodka daily, no plans for cessation


   - ativan prn


   - thiamine


# behavioral issues - very resistant to following hospital protocols; makes his 

treatment difficult; had initially signed out AMA, then returned intoxicated


   - appreciate Brianne Naranjo's note


# tobacco use - nicotine prn


# htn - losartan, terazosin, start low dose norvasc with elevated BPs here


# neuropathy - gabapentin


# chronic pain - baclofen, oxy


# hx traumatic ICH 2009


# lovenox











Subjective: no new complaints.


Objective: 


 Vital Signs











Temp Pulse Resp BP Pulse Ox


 


 36.6 C   107 H  16   136/86 H  94 


 


 10/28/17 08:00  10/28/17 08:00  10/28/17 08:00  10/28/17 12:38  10/28/17 08:00








 Laboratory Results





 10/28/17 04:30 





 10/28/17 04:30 





 











 10/27/17 10/28/17 10/29/17





 05:59 05:59 05:59


 


Intake Total 1791 3001 


 


Output Total  4 


 


Balance 1791 2997 








 











PT  13.3 SEC (12.0-15.0)   10/23/17  20:30    


 


INR  1.02  (0.83-1.16)   10/23/17  20:30    














- Physical Exam


Constitutional: no apparent distress, appears nourished, not in pain


Cardiovascular: No edema


Respiratory: no respiratory distress


Skin: no rashes or abrasions, no fluctuance, no induration


Neurologic: AAOx3


Psychiatric: interacting appropriately, No encephalopathic, No anxious, No poor 

memory





ICD10 Worksheet


Patient Problems: 


 Problems











Problem Status Onset


 


Cellulitis and abscess Acute

## 2017-10-28 NOTE — SOAPPROG
SOAP Progress Note


Assessment/Plan: 


Assessment/Plan:


58 yo man w stage IIIB anal canal SCC admitted for 5FU/mitomycin


1. Stage IIIB anal SCC - so far tolerating chemoXRT well although very early in 

treatment


will be discharged Sunday to hotel and readmited for C2 5FU/mitpomycin in a few 

weeks


Cont daily XRT


2. Etoh abuse - no signs of withdrawl


receiving vodka scheduled


3. Transaminitis - resolved but TBili up


will follow


4. Dispo - pt is homeless 


will go to a hotel on d/c


needs f/u w Dr Gómez next week





10/28/17 11:59





Subjective: 





No acute events


reports some loose stools this am


reports mild itching of anus


Objective: 





 Vital Signs











Temp Pulse Resp BP Pulse Ox


 


 36.6 C   107 H  16   136/86 H  94 


 


 10/28/17 08:00  10/28/17 08:00  10/28/17 08:00  10/28/17 08:00  10/28/17 08:00








 Laboratory Results





 10/28/17 04:30 





 10/28/17 04:30 





 











 10/27/17 10/28/17 10/29/17





 05:59 05:59 05:59


 


Intake Total 1791 3001 


 


Output Total  4 


 


Balance 1791 2997 








 











PT  13.3 SEC (12.0-15.0)   10/23/17  20:30    


 


INR  1.02  (0.83-1.16)   10/23/17  20:30    








Vitals reviewed


Gen - NAD


HEENT - anicteric


CV - RRR


Chest - CTAB


Abd - soft, NT, BS+


Ext - no edema





ICD10 Worksheet


Patient Problems: 


 Problems











Problem Status Onset


 


Cellulitis and abscess Acute

## 2017-10-29 VITALS — TEMPERATURE: 98.2 F | RESPIRATION RATE: 15 BRPM | OXYGEN SATURATION: 94 % | HEART RATE: 105 BPM

## 2017-10-29 VITALS — SYSTOLIC BLOOD PRESSURE: 130 MMHG | DIASTOLIC BLOOD PRESSURE: 78 MMHG

## 2017-10-29 RX ADMIN — ENOXAPARIN SODIUM SCH MG: 100 INJECTION SUBCUTANEOUS at 08:45

## 2017-10-29 RX ADMIN — LOSARTAN POTASSIUM SCH MG: 50 TABLET, FILM COATED ORAL at 12:44

## 2017-10-29 RX ADMIN — GABAPENTIN SCH MG: 400 CAPSULE ORAL at 12:44

## 2017-10-29 RX ADMIN — GABAPENTIN SCH MG: 400 CAPSULE ORAL at 08:44

## 2017-10-29 RX ADMIN — Medication SCH ML: at 08:43

## 2017-10-29 RX ADMIN — OXYCODONE HYDROCHLORIDE PRN MG: 15 TABLET ORAL at 12:46

## 2017-10-29 RX ADMIN — Medication SCH MG: at 08:44

## 2017-10-29 RX ADMIN — FLUTICASONE PROPIONATE SCH: 50 SPRAY, METERED NASAL at 09:00

## 2017-10-29 RX ADMIN — BACLOFEN PRN MG: 10 TABLET ORAL at 08:43

## 2017-10-29 NOTE — ASDISCHSUM
----------------------------------------------

Discharge Information

----------------------------------------------

Plan Status:Has needs-TBD                            Medically Cleared to Leave:10/29/2017

Discharge Date:10/29/2017 03:30 PM                   CM D/C Disposition:

ADT D/C Disposition:Home, Routine, Self-Care         Projected Discharge Date:10/29/2017 03:30 PM

Transportation at D/C:[employer]                    Discharge Delay Reason:

Follow-Up Date:10/29/2017 03:30 PM                   Discharge Slot:

Final Diagnosis:

----------------------------------------------

Placement Information

----------------------------------------------

----------------------------------------------

Patient Contact Information

----------------------------------------------

Contact Name:BREANNA                            Relationship:Sister

Address:                                             Home [phone]

                                                     Work Phone:

City:[place]                                      Alternate Phone:

[facility] Code:[place]                                    Email:

----------------------------------------------

Financial Information

----------------------------------------------

Financial Class:MD

[place] Desc[place] [place]        Primary Plan Number:[id number]

Secondary Plan Desc:                                 Secondary Plan Number:

 

 

----------------------------------------------

Assessment Information

----------------------------------------------

----------------------------------------------

[facility] CM Progress Note

----------------------------------------------

CM Note

 

CM Note                       

Notes:

Patient twice admitted yesterday after deciding to leave shortly after admission. Patient here for 

chemo and radiation for rectal cancer. Patient to get PICC today. Left message for Brianne Naranjo to 

see patient to establish a base line of behavior or necessary boundaries. Patient is refusing 

nicotine patch and also states he plans to continue drinking. Case management will continue to 

follow.

 

Date Signed:  10/24/2017 10:07 AM

Electronically Signed By:JOSE RAMON Davidson

 

 

----------------------------------------------

[facility] CM Progress Note

----------------------------------------------

CM Note

 

CM Note                       

Notes:

Requested that Martha Barrios meet with patient because he was very concerned that he would not 

receive assistance with housing after his treatment though he had been told that this would be 

arranged. Martha spent a considerable amount to time with patient and patient appeared to trust her 

with some very personal information. He also referred to important people in his life Francesca , his 

sister [phone], Brother Zak [phone], and friend Alex [phone]. He also told Martha he woul 

be willing to get a nicotin patch while he is here and said he would take the vodka if it is in 

cranberry juice. Martha plans to continue to touch base with this patient and address needs not 

anticipated as they arise. Case management will continue to follow. 

 

Date Signed:  10/24/2017 06:04 PM

Electronically Signed By:JOSE RAMON Davidson

 

 

----------------------------------------------

[facility] CM Progress Note

----------------------------------------------

CM Note

 

CM Note                       

Notes:

[facility]TC-100 started, at front of chart. CM will finish in AM. Ines notified for LT Kik[facility] katlin to be 

atarted as pt may need SNF placement at OR.

 

Date Signed:  10/25/2017 05:02 PM

Electronically Signed By:Alejandra Thomason RN

 

 

----------------------------------------------

[facility] CM Progress Note

----------------------------------------------

CM Note

 

CM Note                       

Notes:

Pt currently does not meet criteria for a LTC bed at a nursing facility. Dir Helen of case 

mgmt. approved pt staying at the [facility] for two weeks. This plan will be revisited near the 

end of those two weeks. Reservation for [facility] from 10/29  11/12. Reservation # is 

[phone]. Pt will also need W/C transport through [facility] set up. This will be covered by 

Medicaid. [employer] will be contacted tomorrow. Discussed plans with pt who is grateful. Went 


over rule about no smoking in room with pt. Discussed rule about limiting visitors and taking good 

care of the room but pt indicated he has no friends so doesnt think it will be an issue. Pt is due 

to complete his chemo on Sunday so DC tentatively planned for then.

 

Date Signed:  10/26/2017 05:09 PM

Electronically Signed By:Elana De La Cruz LCSW

 

 

----------------------------------------------

[facility] CM Progress Note

----------------------------------------------

CM Note

 

CM Note                       

Notes:

Contacted [facility] today and they contacted [facility] [facility] for pt. MOW will start on 

Tuesday 10/31 so [facility] will drop off food supplies at the [facility] Monday for pt . Pt signed 

the respite care agreement which was faxed to [facility] and placed in pt's chart. Transportation for 


pt's appts at [facility] made through [facility] starting 10/30 through 11/10. Kelly Patrick will continue 

transportation arrangements after that. C/M will continue to follow.

 

Date Signed:  10/27/2017 03:27 PM

Electronically Signed By:Elana De La Cruz LCSW

 

 

----------------------------------------------

Intervention Information

----------------------------------------------

Intervention Type:*Incorrect Registration            Date of Service:10/24/2017 10:37 AM

Patient Type:Observation                             Staff Member:RAJESH Johnson Susan

Hours:                                               Discipline:

Severity:                                            Comment:

## 2017-10-29 NOTE — GDS
[f rep st]



                                                             DISCHARGE SUMMARY





DISCHARGE DIAGNOSES:  

1.  Squamous cell carcinoma of the anus, on chemotherapy.

2.  Alcohol abuse.

3.  Tobacco abuse.

4.  Hypertension.

5.  Neuropathy.

6.  Chronic pain.

7.  History of traumatic intracranial hemorrhage.



HISTORY:  The patient is a 57-year-old male recently diagnosed with a squamous cell carcinoma of the 
anus.  He is homeless.  He is being admitted for chemotherapy, which he completed as an inpatient.  [facility] Management arranged for discharge to a hotel.  He will need daily radiation therapy for the next 
5 weeks.  We did take his PICC line out prior to discharge. 



The patient does drink alcohol and has no plans for cessation.  He was given daily vodka throughout h
is hospitalization.  He also was frequently caught exiting the floor to smoke cigarettes.  There are 
a lot of behavioral issues and he was seen in consultation with Brianne Naranjo.  He prefers to go by [facility] [employer]
[employer].  He is very resistant to following usual hospital protocols.  He did have a couple times where he
 signed out AMA and then returned.  By the end of his hospitalization, however, he was quite complian
t and working well with his treatment protocol.



DISCHARGE MEDICATIONS:  Please see computer record for full detailed list.  New medication:  Amlodipi
ne 2.5 mg p.o. daily added to his blood pressure regimen due to elevated blood pressures here.



FOLLOWUP:  With Dr. Terrell for ongoing coordination of malignancy treatment.



TIME SPENT:  Greater than 30 minutes' time was spent arranging this discharge.  Patient was seen and 
examined by me on day of discharge.





Job #:  [id number]

## 2017-11-27 ENCOUNTER — HOSPITAL ENCOUNTER (OUTPATIENT)
Dept: HOSPITAL 80 - FIMAGING | Age: 57
Discharge: HOME | End: 2017-11-27
Attending: INTERNAL MEDICINE
Payer: MEDICAID

## 2017-11-27 DIAGNOSIS — C21.0: Primary | ICD-10-CM

## 2017-11-27 PROCEDURE — 77001 FLUOROGUIDE FOR VEIN DEVICE: CPT

## 2017-11-27 PROCEDURE — 36569 INSJ PICC 5 YR+ W/O IMAGING: CPT

## 2017-11-27 PROCEDURE — 02HV33Z INSERTION OF INFUSION DEVICE INTO SUPERIOR VENA CAVA, PERCUTANEOUS APPROACH: ICD-10-PCS

## 2017-11-27 PROCEDURE — C1751 CATH, INF, PER/CENT/MIDLINE: HCPCS

## 2018-02-08 NOTE — EDPHY
H & P


Stated Complaint: 2 days N/V and diarrhea, flu like symptoms.


HPI/ROS: 





HPI





CHIEF COMPLAINT:  Nausea, vomiting, diarrhea, chills





HISTORY OF PRESENT ILLNESS:  Patient 57-year-old male, history of squamous cell 

carcinoma of the anus and recently underwent radiation and chemotherapy, 

history of alcoholism, additionally hypertension intracranial bleed, presents 

emergency room nausea vomiting abdominal pain and diarrhea loose watery stools 

since last night.  Multiple episodes of watery diarrhea.  No blood.  Patient 

reports chills.  And some abdominal discomfort.  Denies any chest pain or 

shortness of breath.  Denies recorded fever but does admit to chills and 

fatigue.





Past Medical History:  Squamous cell carcinoma of the anus, alcohol abuse, 

history tobacco use, hypertension ,traumatic intracranial bleed





Past Surgical History:  No recent surgery





Social History:  Homeless, denies daily use of alcohol





Family History:  Noncontributory








ROS   


REVIEW OF SYSTEMS:


A comprehensive 10 point review of systems is otherwise negative aside from 

elements mentioned in the history of present illness.








Exam   


Constitutional  appears well nontoxic no acute distress triage nursing summary 

reviewed, vital signs reviewed, awake/alert. 


Eyes   normal conjunctivae and sclera, EOMI, PERRLA. 


HENT   normal inspection, atraumatic, moist mucus membranes, no epistaxis, neck 

supple/ no meningismus, no raccoon eyes. 


Respiratory   clear to auscultation bilaterally, normal breath sounds, no 

respiratory distress, no wheezing. 


Cardiovascular   rate normal, regular rhythm, no murmur, no edema, distal 

pulses normal. 


Gastrointestinal  mild tender palpation diffusely no rebound, no guarding, 

normal bowel sounds, no distension, no pulsatile mass. 


Genitourinary   no CVA tenderness. 


Musculoskeletal  no midline vertebral tenderness, full range of motion, no calf 

swelling, no tenderness of extremities, no meningismus, good pulses, 

neurovascularly intact.


Skin   pink, warm, & dry, no rash, skin atraumatic. 


Neurologic   awake, alert and oriented x 3, AAOx3, moves all 4 extremities 

equally, motor intact, sensory intact, CN II-XII intact, normal cerebellar, 

normal vision, normal speech. 


Psychiatric   normal mood/affect. 


Heme/Lymph/Immune   no lymphadenopathy.





Differential diagnosis includes but is not limited to and in no particular order

:  Proctitis, colitis, diverticulitis, radiation colitis, acute GI illness, 

diarrheal illness, infectious diarrhea  Bowel obstruction, appendicitis, 

gallbladder disease, diverticulitis, colitis, enteritis, perforated viscus, 

gastritis, GERD, esophagitis, urinary tract infection, pyelonephritis, kidney 

stones





Medical Decision Making:  Plan for this patient IV establishment with blood draw

, IV fluid bolus 2 L normal saline, IV Zofran for nausea CT scan abdomen pelvis 

with IV contrast for acute abdominal pain. Re-evaluate.





Re-evaluation:








CT scan abdomen pelvis with IV contrast does not show any acute inflammatory 

process.





1808:  Patient re-evaluate he is feeling better.  He has not had any diarrhea 

here.  I asked for stool specimen however patient states that he cannot provide 

a diarrheal stool specimen


He has not had any vomiting here.  Vital signs reviewed and are normal. Blood 

work reviewed.  No fever.





Most likely has a diarrheal illness.  I will touch base with his cancer doctors.





I do feel that he can go home he is well-hydrated he is not vomiting he has no 

fever he has no diarrhea.  CT scan does not show inflammatory process.





Will prescribe Zofran.  Additionally I will request stool studies.





Spoke with Dr. Reynoso with Oncology. Iban with d/c. 





1836 Spoke with Dr. Reynoso.  Is okay with the patient going home.  Zofran and 

Lomotil.  Follow-up with them.


Return precautions discussed with the patient.








Source: Patient





- Personal History


Current Tetanus Diphtheria and Acellular Pertussis (TDAP): Yes


Tetanus Vaccine Date: 2012





- Medical/Surgical History


Hx Asthma: No


Hx Chronic Respiratory Disease: No


Hx Diabetes: No


Hx Cardiac Disease: No


Hx Renal Disease: No


Hx Cirrhosis: No


Hx Alcoholism: Yes


Hx HIV/AIDS: No


Hx Splenectomy or Spleen Trauma: No


Other PMH: HTN, Neuropathy, Anal Cancer





- Social History


Smoking Status: Current every day smoker


Constitutional: 


 Initial Vital Signs











Temperature (C)  37.1 C   02/08/18 15:53


 


Heart Rate  88   02/08/18 15:53


 


Respiratory Rate  16   02/08/18 15:53


 


Blood Pressure  180/107 H  02/08/18 15:53


 


O2 Sat (%)  97   02/08/18 15:53








 











O2 Delivery Mode               Room Air














Allergies/Adverse Reactions: 


 





Penicillins Allergy (Intermediate, Verified 10/29/16 23:24)


 Rash








Home Medications: 














 Medication  Instructions  Recorded


 


Lidocaine 5% [Lidoderm 5% Patch 1 ea TD DAILY 08/11/15





(*)]  


 


Terazosin HCl [Hytrin 2 MG (*)] 2 mg PO HS 08/11/15


 


traMADol [Ultram 50 mg (*)] 50 - 100 mg PO Q8 PRN 08/11/15


 


traZODone [traZODONE 100MG (*)] 200 mg PO HS 08/11/15


 


Baclofen [Baclofen 10 mg (*)] 10 mg PO HS 10/23/17


 


Fluticasone Nasal [Flonase Nasal 2 sprays NASAL DAILY 10/23/17





Spray]  


 


Gabapentin [Neurontin 400 MG (*)] 800 mg PO TID 10/23/17


 


Losartan Potassium [Cozaar 50 mg 50 mg PO DAILY 10/23/17





(*)]  


 


Omeprazole [Prilosec 20 mg] 40 mg PO DAILY 10/23/17


 


oxyCODONE IR [Oxycodone Ir (*)] 5 mg PO BID PRN 10/23/17


 


amLODIPine BESYLATE [Norvasc 2.5 2.5 mg PO DAILY #30 tab 10/29/17





mg (*)]  


 


Ondansetron HCl [Zofran] 4 mg PO Q4-6PRN PRN #10 tablet 02/08/18














Medical Decision Making





- Diagnostics


Imaging Results: 


 Imaging Impressions





Abdomen CT  02/08/18 16:44


Impression: 


1. Nothing acute.


2. Cholelithiasis.


3. Atrophic pancreas.


4. Sigmoid diverticulosis.


5. Fatty infiltration of the liver.


6. Circumferential wall thickening of the anal region that probably represents 

underlying cancer. There is no associated surrounding inflammation to suggest 

acute infection or inflammation.


 


Findings and recommendations discussed with Neil John MD, at 6:00 PM, 2/8 /2018.


 


Final report concurs with initial preliminary interpretation. 














- Data Points


Laboratory Results: 


 Laboratory Results





 02/08/18 16:50 





 02/08/18 16:50 





 











  02/08/18 02/08/18 02/08/18





  16:50 16:50 16:50


 


WBC      





    


 


RBC      





    


 


Hgb      





    


 


Hct      





    


 


MCV      





    


 


MCH      





    


 


MCHC      





    


 


RDW      





    


 


Plt Count      





    


 


MPV      





    


 


Neut % (Auto)      





    


 


Lymph % (Auto)      





    


 


Mono % (Auto)      





    


 


Eos % (Auto)      





    


 


Baso % (Auto)      





    


 


Nucleat RBC Rel Count      





    


 


Absolute Neuts (auto)      





    


 


Absolute Lymphs (auto)      





    


 


Absolute Monos (auto)      





    


 


Absolute Eos (auto)      





    


 


Absolute Basos (auto)      





    


 


Absolute Nucleated RBC      





    


 


Immature Gran %      





    


 


Immature Gran #      





    


 


PT      13.2 SEC SEC





     (12.0-15.0) 


 


INR      0.98 





     (0.83-1.16) 


 


APTT      25.5 SEC SEC





     (23.0-38.0) 


 


VBG Lactic Acid  1.6 mmol/L mmol/L    





   (0.7-2.1)   


 


Sodium    142 mEq/L mEq/L  





    (135-145)  


 


Potassium    3.4 mEq/L L mEq/L  





    (3.5-5.2)  


 


Chloride    91 mEq/L L mEq/L  





    ()  


 


Carbon Dioxide    31 mEq/l mEq/l  





    (22-31)  


 


Anion Gap    20 mEq/L H mEq/L  





    (8-16)  


 


BUN    21 mg/dL mg/dL  





    (7-23)  


 


Creatinine    0.7 mg/dL mg/dL  





    (0.7-1.3)  


 


Estimated GFR    > 60   





    


 


Glucose    125 mg/dL H mg/dL  





    ()  


 


Calcium    10.5 mg/dL H mg/dL  





    (8.5-10.4)  


 


Total Bilirubin    1.6 mg/dL H mg/dL  





    (0.1-1.4)  


 


Conjugated Bilirubin    0.6 mg/dL H mg/dL  





    (0.0-0.5)  


 


Unconjugated Bilirubin    1.0 mg/dL mg/dL  





    (0.0-1.1)  


 


AST    99 IU/L H IU/L  





    (17-59)  


 


ALT    82 IU/L H IU/L  





    (21-72)  


 


Alkaline Phosphatase    65 IU/L IU/L  





    ()  


 


Total Protein    8.2 g/dL g/dL  





    (6.3-8.2)  


 


Albumin    4.9 g/dL g/dL  





    (3.5-5.0)  


 


Lipase    79 IU/L IU/L  





    ()  














  02/08/18





  16:50


 


WBC  4.34 10^3/uL 10^3/uL





   (3.80-9.50) 


 


RBC  3.96 10^6/uL L 10^6/uL





   (4.40-6.38) 


 


Hgb  15.6 g/dL g/dL





   (13.7-17.5) 


 


Hct  41.9 % %





   (40.0-51.0) 


 


MCV  105.8 fL H fL





   (81.5-99.8) 


 


MCH  39.4 pg H pg





   (27.9-34.1) 


 


MCHC  37.2 g/dL H g/dL





   (32.4-36.7) 


 


RDW  12.2 % %





   (11.5-15.2) 


 


Plt Count  78 10^3/uL L 10^3/uL





   (150-400) 


 


MPV  10.4 fL fL





   (8.7-11.7) 


 


Neut % (Auto)  71.5 % %





   (39.3-74.2) 


 


Lymph % (Auto)  14.3 % L %





   (15.0-45.0) 


 


Mono % (Auto)  13.8 % H %





   (4.5-13.0) 


 


Eos % (Auto)  0.2 % L %





   (0.6-7.6) 


 


Baso % (Auto)  0.2 % L %





   (0.3-1.7) 


 


Nucleat RBC Rel Count  0.0 % %





   (0.0-0.2) 


 


Absolute Neuts (auto)  3.10 10^3/uL 10^3/uL





   (1.70-6.50) 


 


Absolute Lymphs (auto)  0.62 10^3/uL L 10^3/uL





   (1.00-3.00) 


 


Absolute Monos (auto)  0.60 10^3/uL 10^3/uL





   (0.30-0.80) 


 


Absolute Eos (auto)  0.01 10^3/uL L 10^3/uL





   (0.03-0.40) 


 


Absolute Basos (auto)  0.01 10^3/uL L 10^3/uL





   (0.02-0.10) 


 


Absolute Nucleated RBC  0.00 10^3/uL 10^3/uL





   (0-0.01) 


 


Immature Gran %  0.0 % %





   (0.0-1.1) 


 


Immature Gran #  0.00 10^3/uL 10^3/uL





   (0.00-0.10) 


 


PT  





  


 


INR  





  


 


APTT  





  


 


VBG Lactic Acid  





  


 


Sodium  





  


 


Potassium  





  


 


Chloride  





  


 


Carbon Dioxide  





  


 


Anion Gap  





  


 


BUN  





  


 


Creatinine  





  


 


Estimated GFR  





  


 


Glucose  





  


 


Calcium  





  


 


Total Bilirubin  





  


 


Conjugated Bilirubin  





  


 


Unconjugated Bilirubin  





  


 


AST  





  


 


ALT  





  


 


Alkaline Phosphatase  





  


 


Total Protein  





  


 


Albumin  





  


 


Lipase  





  











Medications Given: 


 








Discontinued Medications





Al Hydroxide/Mg Hydroxide (Maalox Susp)  30 ml PO ONCE ONE


   Stop: 02/08/18 18:06


   Last Admin: 02/08/18 18:27 Dose:  30 ml


Hyoscyamine Sulfate (Levsin, Hyomax-Sl)  0.25 mg PO ONCE ONE


   Stop: 02/08/18 18:06


   Last Admin: 02/08/18 18:27 Dose:  0.25 mg


Sodium Chloride (Ns)  1,000 mls @ 0 mls/hr IV EDNOW ONE; Wide Open


   PRN Reason: Protocol


   Stop: 02/08/18 16:35


   Last Admin: 02/08/18 16:54 Dose:  1,000 mls


Sodium Chloride (Ns)  1,000 mls @ 0 mls/hr IV ONCE ONE


   PRN Reason: Wide Open


   Stop: 02/08/18 16:42


   Last Admin: 02/08/18 17:53 Dose:  1,000 mls


Lidocaine (Lidocaine 2% Viscous)  15 ml PO ONCE ONE


   Stop: 02/08/18 18:06


   Last Admin: 02/08/18 18:27 Dose:  15 ml


Ondansetron HCl (Zofran)  4 mg IVP EDNOW ONE


   Stop: 02/08/18 16:35


   Last Admin: 02/08/18 16:54 Dose:  4 mg








Departure





- Departure


Disposition: Home, Routine, Self-Care


Clinical Impression: 


 Nausea vomiting and diarrhea





Condition: Good


Instructions:  Acute Nausea and Vomiting (ED), Acute Diarrhea (ED)


Additional Instructions: 


1.  Follow up with your doctor.


2.  Return emergency room if he develops worsening pain fever vomiting diarrhea 

bloody stool.


3.  Zofran as needed for nausea.


Referrals: 


Caren Knox [Primary Care Provider] - As per Instructions


Prescriptions: 


Ondansetron HCl [Zofran] 4 mg PO Q4-6PRN PRN #10 tablet


 PRN Reason: Nausea/Vomiting, Use 1st
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
## 2018-12-19 NOTE — PDANEPAE
ANE Past Medical History





- Cardiovascular History


Hx Hypertension: Yes


Hx Arrhythmias: No


Hx Chest Pain: No


Hx Coronary Artery / Peripheral Vascular Disease: No


Hx CHF / Valvular Disease: No


Hx Palpitations: No





- Pulmonary History


Hx COPD: No


Hx Asthma/Reactive Airway Disease: No


Hx Recent Upper Respiratory Infection: No


Hx Oxygen in Use at Home: No


Hx Sleep Apnea: No


Sleep Apnea Screening Result - Last Documented: Positive





- Neurologic History


Hx Cerebrovascular Accident: No


Hx Seizures: No


Hx Dementia: No





- Endocrine History


Hx Diabetes: No


Hypothyroid: No


Hyperthyroid: No


Obesity: no





- Renal History


Hx Renal Disorders: No





- Liver History


Hx Hepatic Disorders: Yes


Hepatic History Comment: ENLARGED LIVER





- Neurological & Psychiatric Hx


Hx Neurological and Psychiatric Disorders: No


Neurological / Psychiatric History Comment: TREMOR





- Cancer History


Hx Cancer: No


Cancer History Comment: ANAL CA DEC 2017





- Congenital Disorder History


Hx Congenital Disorders: No





- GI History


Hx Gastrointestinal Disorders: Yes


Gastrointestinal History Comment: food gets stuck in throat, has to throw it , 

medications get stuck





- Other Health History


Other Health History: HOARSE.  SINUS DRAINAGE/CONGESTION





- Chronic Pain History


Chronic Pain: Yes





- Surgical History


Prior Surgeries: nylon ligaments right shoulder





ANE Review of Systems


Review of Systems: 








- Exercise capacity


METS (RN): 4 METS





ANE Patient History





- Allergies


Allergies/Adverse Reactions: 








Penicillins Allergy (Intermediate, Verified 05/14/18 16:33)


 Rash








- Home Medications


Home Medications: 








Lidocaine 5% [Lidoderm 5% Patch] 1 ea TD DAILY 08/11/15 [Last Taken 10/23/17]


Terazosin HCl [Hytrin 2 MG (*)] 2 mg PO HS 08/11/15 [Last Taken 10/22/17]


traMADol [Ultram 50 mg (*)] 50 - 100 mg PO Q8 PRN 08/11/15 [Last Taken Unknown]


traZODone [traZODONE 100MG (*)] 200 mg PO HS 08/11/15 [Last Taken 10/22/17]


Baclofen [Baclofen 10 mg (*)] 10 mg PO HS 10/23/17 [Last Taken 10/22/17]


Fluticasone Nasal [Flonase Nasal Spray] 2 sprays NASAL DAILY 10/23/17 [Last 

Taken 10/23/17]


Gabapentin [Neurontin 400 MG (*)] 800 mg PO TID 10/23/17 [Last Taken 10/23/17]


Losartan Potassium [Cozaar 50 mg (*)] 50 mg PO DAILY 10/23/17 [Last Taken 10/23/

17]


Omeprazole [Prilosec 20 mg] 40 mg PO DAILY 10/23/17 [Last Taken 10/23/17]


oxyCODONE IR [Oxycodone Ir (*)] 5 mg PO BID PRN 10/23/17 [Last Taken Unknown]








- NPO status


NPO Status: no food or drink >8 hours


NPO Since - Liquids (Date): 12/18/18


NPO Since - Liquids (Time): 20:00


NPO Since - Solids (Date): 12/18/18


NPO Since - Solids (Time): 20:00





- Smoking Hx


Smoking Status: Current every day smoker





- Family Anes Hx


Family Hx Anesthesia Complications: no





ANE Labs/Vital Signs





- Vital Signs


Vital Signs: reviewed preoperatively; see RN documention for details


Blood Pressure: 190/121


Heart Rate: 84


Respiratory Rate: 11


O2 Sat (%): 93


Height: 177.8 cm


Weight: 77.111 kg





ANE Physical Exam





- ASA Status


ASA Status: III

## 2019-02-05 NOTE — EDPHY
General


Time Seen by Provider: 02/05/19 12:26


Narrative: 





CLINICAL IMPRESSION: 


Resolved back spasms 


_________________


ASSESSMENT/PLAN:


58-year-old alcoholic male well known to our emergency department who presents 

to the emergency department by ambulance today with complaints of back spasms.  

On initial evaluation, patient was significantly lethargic and frequently 

falling asleep during questioning.  He appears in no acute distress.  He is 

intoxicated.  He has a history of anal cancer and is scheduled for repeat 

colonoscopy next week.  He has no reproducible back pain on initial and follow-

up examinations.  He did reportedly have an unwitnessed fall yesterday and does 

have a right eye contusion.  CT scan of the head and C-spine are negative for 

acute fracture and intracranial hemorrhage.  He has no evidence of globe injury 

or hyphema.  Abdomen is soft with no focal peritoneal findings, no chest wall 

tenderness or rib pain.  Full range of motion of all extremities.  Labs show a 

renal insufficiency that apparently is "high" for the patient according to his 

primary care provider who came to the emergency department to speak with me.  

Patient has chronic transaminitis.  She reviewed patient's labs and stated that 

his platelet count is actually improved from prior that his hemoglobin and 

hematocrit appear stable and his leukopenia is essentially unchanged.  Patient 

refused rectal exam, despite conflicting reports of rectal bleeding, he states 

he has not had any. He is most notably upset that he received fentanyl from EMS 

crew and is repetitively asking for a sandwich.  Patient's PCP convinced him to 

have a 2nd L of IV fluid which he refused from me.  I see no indication for 

emergent admission at this point.  I explained this to patient's PCP.  He was 

recently approved for housing and she will continue to work towards this with 

him.  I encouraged follow-up with primary care this week.  Warning signs return 

to ED outlined discharge. 


_________________


DIFFERENTIAL DX:


 Differential includes but not limited to alcohol intoxication, electrolyte 

imbalance, transaminitis, infection, co ingestion, intracranial hemorrhage, 

facial trauma


_________________


ED PROCEDURES:


See lab and/or imaging results below  


_________________


ED COURSE:


2:00 p.m..  CT scans discussed with Dr. Oppenheimer.  Patient has old facial 

fractures but no acute intracranial or C-spine fracture.


2:15 p.m.:  Patient reassessed, ambulated to the restroom without difficulty to 

provide urine sample.  CT scan results discussed.  I explained to the patient 

that his primary care was concerned about complaints of rectal bleeding 

although patient tells me he has not had that for "many months" patient is 

refusing a rectal exam.  He states he has a CT scan, endoscopy and colonoscopy 

scheduled next week but is very upset that this is on [employer] Day.  He was 

provided snacks but upset that he is not receiving a sandwich.  I offered 

additional IV fluids for an elevated creatinine of 1.8 but patient is refusing 

this.  He states he would like to go for a walk outside.  I told him if he goes 

outside he cannot come back in without re registering.  Patient then states I 

do not want to be discharged I just want a sandwich.


3:30 p.m..  Caren Knox with [facility] arrives in the ED.  She is patient'

s primary care provider.  I explained patient's entire workup with her.  I 

explained the patient refused a rectal exam.  She has convince the patient to 

receive a 2nd L of IV fluid which is ordered.  I do not see any indication for 

emergent admission at this time.  Patient was apparently recently accepted for 

housing.


_________________


CHIEF COMPLAINT:  "Back spasms"


_________________


HPI:


 58-year-old male presents to the emergency department by EMS with complaints 

of back spasms.  Patient is a very poor historian at this point, very sleepy 

and frequently falling asleep during my questions.  He prefers to go by "bad man

".  He reports he fell yesterday and "face planted".  He is regular alcoholic, 

well known to our emergency department.  He has no complaints of chest pain, 

shortness of breath, abdominal pain, nausea or vomiting.  Apparently his 

primary care provider called him and became concerned that he may have been 

vomiting and called the ambulance to pick him up.  Patient has a history of 

anal cancer, treated last year and has an upcoming colonoscopy.  He denies 

rectal bleeding to me and is refusing rectal exam.  He denies headache, 

dizziness and vertigo.


_________________


PAST MEDICAL HISTORY: 


 Alcoholic, history of anal cancer, hypertension


See nurse/triage notes for additional history if applicable 





Pertinent Past Surgical History:  None reported





Family History:  Unable to obtain





Social History:  Alcoholic, lives in storage unit, prefers to go by bad man


_________________


REVIEW OF SYSTEMS:


All other systems negative


Constitutional:  No fever, no chills, appetite change.


Eyes:  No discharge, vision change


ENT:  No sore throat, congestion, ear pain.


Cardiovascular:  No chest pain, no palpitations.


Respiratory:  No cough, no shortness of breath.


Gastrointestinal: No abdominal pain, no vomiting, diarrhea.


Genitourinary:  No hematuria, dysuria, flank pain, pelvic pain


Musculoskeletal:  No back pain, joint swelling, joint pain, myalgias.


Skin:  No rashes, color change.


Neurological:  No headache, dizziness, weakness.





_________________


PHYSICAL EXAM:


General Appearance:  Alert, smells of alcohol, disheveled, very sleepy, 

frequently falling asleep during questioning, hypotensive, remainder of vital 

signs stable.


HEENT: Right orbital contusion, TMs are clear bilaterally no perforation or FB, 

no injection, no evidence of serous or mucopurulent otitis.  No hemotympanum or 

Prasad sign Oropharynx clear is no erythema or exudates, no tonsillar 

hypertrophy or asymmetry.  Dentition without abnormality.  No intraoral 

laceration


Eyes: PERRLA, no acute vision change, nystagmus, swelling, discharge, pain or 

photosensitivity. Conjunctiva pink, no pallor or injection.  No evidence of 

globe injury or hyphema


Neck: Supple, nontender, no lymphadenopathy, no midline pain, FROM, no 

meningismus.


Respiratory:  There are no retractions, lungs are clear to auscultation.  No 

rib tenderness to palpation


Cardiac:  Regular rate and rhythm, no murmurs or gallops.


Gastrointestinal: Abdomen is soft, nontender, bowel sounds normal, no masses/

hernia, no rigidity, guarding or focal peritoneal findings.


Neurological: [ Alert and oriented x 3, sleepy


Skin: Warm, dry, no rashes, no nodules on palpation.


Musculoskeletal:  Extremities are symmetrical, full range of motion, no 

tenderness, deformity, swelling, or erythema.


Psychiatric:  Patient is oriented X 3, initially argumentative and then calms 

and is cooperative





_________________


MEDICAL DECISION MAKING:


Patient was seen independently. Secondary supervising physician at time of 

evaluation was Dr Castro .


Diagnosis: Back spasms, alcohol intoxication . New, requires workup


Summary:  See Assessment and Plan for summary of ED visit 


Clinical lab tests:  ordered / reviewed.


Independent visualization of images, tracing, or specimens:  Yes.


Decision to obtain medical records or history from someone other than the 

patient:  Patient's primary care provider Caren Knox who came to the ED


Review / Summarize previous medical records:  Yes


Discussed patient with another provider:  Dr. Castro,





Patient Progress:  Radiology. 





- Diagnostics


Imaging Results: 


 Imaging Impressions





Face CT  02/05/19 13:02


Impression: Nothing acute identified.


 


2. CT of the Facial Bones, 1:17 PM


 


Indication:  Trauma. Head injury. ETOH.


 


Technique:   0.625 mm thick collimated slices were obtained through the face 

from just below the mandible to above the frontal sinuses. The data was 

reconstructed in the sagittal and coronal planes. Dose reduction techniques 

were utilized.


 


Findings: No acute facial bone fracture is identified. There is an old healed 

left lamina propria impaction injury without prolapse of the medial rectus 

muscle. The globes and intraorbital contents are symmetric and normal. There 

are old nasal bone and nasal septal fracture deformities, stable since 

September 15, 2018. The pterygoid plates, zygomatic arches and orbital margins 

are intact. The paranasal and mastoid sinuses and both middle ears are normally 

aerated.


 


Impression: Nothing acute identified.


 


Results discussed with Stuart Hendrickson at 2:00 PM.


 


 


General information for patients regarding this examination can be found at 

[email or web address].


 


If you have questions or comments about this report, please contact me at [phone](hospital) or [phone] (cell). 


 


 


 








Head CT  02/05/19 13:02


Impression: Nothing acute identified.


 


2. CT of the Facial Bones, 1:17 PM


 


Indication:  Trauma. Head injury. ETOH.


 


Technique:   0.625 mm thick collimated slices were obtained through the face 

from just below the mandible to above the frontal sinuses. The data was 

reconstructed in the sagittal and coronal planes. Dose reduction techniques 

were utilized.


 


Findings: No acute facial bone fracture is identified. There is an old healed 

left lamina propria impaction injury without prolapse of the medial rectus 

muscle. The globes and intraorbital contents are symmetric and normal. There 

are old nasal bone and nasal septal fracture deformities, stable since 

September 15, 2018. The pterygoid plates, zygomatic arches and orbital margins 

are intact. The paranasal and mastoid sinuses and both middle ears are normally 

aerated.


 


Impression: Nothing acute identified.


 


Results discussed with Stuart Hendrickson at 2:00 PM.


 


 


General information for patients regarding this examination can be found at 

[email or web address].


 


If you have questions or comments about this report, please contact me at [phone](hospital) or [phone] (cell). 


 


 


 














- History


Smoking Status: Current every day smoker





- Objective


Vital Signs: 


 Initial Vital Signs











Temperature (C)  37 C   02/05/19 12:29


 


Heart Rate  99   02/05/19 12:29


 


Respiratory Rate  16   02/05/19 12:29


 


Blood Pressure  85/61 L  02/05/19 12:29


 


O2 Sat (%)  90 L  02/05/19 12:29








 











O2 Delivery Mode               Room Air














Allergies/Adverse Reactions: 


 





Penicillins Allergy (Intermediate, Verified 05/14/18 16:33)


 Rash








Home Medications: 














 Medication  Instructions  Recorded


 


Lidocaine 5% [Lidoderm 5% Patch] 1 ea TD DAILY 08/11/15


 


Terazosin HCl [Hytrin 2 MG (*)] 2 mg PO HS 08/11/15


 


traMADol [Ultram 50 mg (*)] 50 - 100 mg PO Q8 PRN 08/11/15


 


traZODone [traZODONE 100MG (*)] 200 mg PO HS 08/11/15


 


Baclofen [Baclofen 10 mg (*)] 10 mg PO HS 10/23/17


 


Fluticasone Nasal [Flonase Nasal 2 sprays NASAL DAILY 10/23/17





Spray]  


 


Gabapentin [Neurontin 400 MG (*)] 800 mg PO TID 10/23/17


 


Losartan Potassium [Cozaar 50 mg 50 mg PO DAILY 10/23/17





(*)]  


 


Omeprazole [Prilosec 20 mg] 40 mg PO DAILY 10/23/17


 


oxyCODONE IR [Oxycodone Ir (*)] 5 mg PO BID PRN 10/23/17


 


amLODIPine BESYLATE [Norvasc 2.5 2.5 mg PO DAILY #30 tab 10/29/17





mg (*)]  


 


Ondansetron HCl [Zofran] 4 mg PO Q4-6PRN PRN #10 tablet 02/08/18











Laboratory Results: 


 Laboratory Results





 02/05/19 13:20 





 02/05/19 13:20 





 











  02/05/19 02/05/19 02/05/19





  14:00 13:20 13:20


 


WBC      





    


 


RBC      





    


 


Hgb      





    


 


Hct      





    


 


MCV      





    


 


MCH      





    


 


MCHC      





    


 


RDW      





    


 


Plt Count      





    


 


MPV      





    


 


Neut % (Auto)      





    


 


Lymph % (Auto)      





    


 


Mono % (Auto)      





    


 


Eos % (Auto)      





    


 


Baso % (Auto)      





    


 


Nucleat RBC Rel Count      





    


 


Absolute Neuts (auto)      





    


 


Absolute Lymphs (auto)      





    


 


Absolute Monos (auto)      





    


 


Absolute Eos (auto)      





    


 


Absolute Basos (auto)      





    


 


Absolute Nucleated RBC      





    


 


Immature Gran %      





    


 


Immature Gran #      





    


 


RBC/WBC/PLT Morphology      





    


 


Platelet Estimate      





    


 


PT      





    


 


INR      





    


 


APTT      





    


 


Sodium      134 mEq/L L mEq/L





     (135-145) 


 


Potassium      4.3 mEq/L mEq/L





     (3.5-5.2) 


 


Chloride      101 mEq/L mEq/L





     () 


 


Carbon Dioxide      19 mEq/l L mEq/l





     (22-31) 


 


Anion Gap      14 mEq/L mEq/L





     (6-14) 


 


BUN      40 mg/dL H mg/dL





     (7-23) 


 


Creatinine      1.8 mg/dL H mg/dL





     (0.7-1.3) 


 


Estimated GFR      39 





    


 


Glucose      122 mg/dL H mg/dL





     () 


 


Calcium      8.9 mg/dL mg/dL





     (8.5-10.4) 


 


Total Bilirubin      0.7 mg/dL mg/dL





     (0.1-1.4) 


 


Conjugated Bilirubin      0.6 mg/dL H mg/dL





     (0.0-0.5) 


 


Unconjugated Bilirubin      0.1 mg/dL mg/dL





     (0.0-1.1) 


 


AST      89 IU/L H IU/L





     (17-59) 


 


ALT      83 IU/L H IU/L





     (21-72) 


 


Alkaline Phosphatase      54 IU/L IU/L





     () 


 


Total Protein      6.9 g/dL g/dL





     (6.3-8.2) 


 


Albumin      4.0 g/dL g/dL





     (3.5-5.0) 


 


Lipase      208 IU/L IU/L





     () 


 


Urine Color  YELLOW     





    


 


Urine Appearance  CLEAR     





    


 


Urine pH  5.0     





   (5.0-7.5)   


 


Ur Specific Gravity  1.012     





   (1.002-1.030)   


 


Urine Protein  NEGATIVE     





   (NEGATIVE)   


 


Urine Ketones  NEGATIVE     





   (NEGATIVE)   


 


Urine Blood  NEGATIVE     





   (NEGATIVE)   


 


Urine Nitrate  NEGATIVE     





   (NEGATIVE)   


 


Urine Bilirubin  NEGATIVE     





   (NEGATIVE)   


 


Urine Urobilinogen  2.0 EU H EU    





   (0.2-1.0)   


 


Ur Leukocyte Esterase  NEGATIVE     





   (NEGATIVE)   


 


Urine RBC  1-3 /hpf /hpf    





   (0-3)   


 


Urine WBC  1-3 /hpf /hpf    





   (0-3)   


 


Ur Epithelial Cells  TRACE /lpf /lpf    





   (NONE-1+)   


 


Urine Mucus  TRACE /lpf /lpf    





   (NONE-1+)   


 


Urine Glucose  NEGATIVE     





   (NEGATIVE)   


 


Ethyl Alcohol    162 mg/dL H mg/dL  





    (0-10)  














  02/05/19 02/05/19





  13:20 13:20


 


WBC    3.63 10^3/uL L 10^3/uL





    (3.80-9.50) 


 


RBC    3.37 10^6/uL L 10^6/uL





    (4.40-6.38) 


 


Hgb    12.9 g/dL L g/dL





    (13.7-17.5) 


 


Hct    35.3 % L %





    (40.0-51.0) 


 


MCV    104.7 fL H fL





    (81.5-99.8) 


 


MCH    38.3 pg H pg





    (27.9-34.1) 


 


MCHC    36.5 g/dL g/dL





    (32.4-36.7) 


 


RDW    12.1 % %





    (11.5-15.2) 


 


Plt Count    156 10^3/uL 10^3/uL





    (150-400) 


 


MPV    10.0 fL fL





    (8.7-11.7) 


 


Neut % (Auto)    61.7 % %





    (39.3-74.2) 


 


Lymph % (Auto)    14.6 % L %





    (15.0-45.0) 


 


Mono % (Auto)    22.0 % H %





    (4.5-13.0) 


 


Eos % (Auto)    0.6 % %





    (0.6-7.6) 


 


Baso % (Auto)    0.8 % %





    (0.3-1.7) 


 


Nucleat RBC Rel Count    0.0 % %





    (0.0-0.2) 


 


Absolute Neuts (auto)    2.24 10^3/uL 10^3/uL





    (1.70-6.50) 


 


Absolute Lymphs (auto)    0.53 10^3/uL L 10^3/uL





    (1.00-3.00) 


 


Absolute Monos (auto)    0.80 10^3/uL 10^3/uL





    (0.30-0.80) 


 


Absolute Eos (auto)    0.02 10^3/uL L 10^3/uL





    (0.03-0.40) 


 


Absolute Basos (auto)    0.03 10^3/uL 10^3/uL





    (0.02-0.10) 


 


Absolute Nucleated RBC    0.00 10^3/uL 10^3/uL





    (0-0.01) 


 


Immature Gran %    0.3 % %





    (0.0-1.1) 


 


Immature Gran #    0.01 10^3/uL 10^3/uL





    (0.00-0.10) 


 


RBC/WBC/PLT Morphology    TNP 





   


 


Platelet Estimate    TNP 





   


 


PT  12.5 SEC SEC  





   (12.0-15.0)  


 


INR  0.91   





   (0.83-1.16)  


 


APTT  22.4 SEC L SEC  





   (23.0-38.0)  


 


Sodium    





   


 


Potassium    





   


 


Chloride    





   


 


Carbon Dioxide    





   


 


Anion Gap    





   


 


BUN    





   


 


Creatinine    





   


 


Estimated GFR    





   


 


Glucose    





   


 


Calcium    





   


 


Total Bilirubin    





   


 


Conjugated Bilirubin    





   


 


Unconjugated Bilirubin    





   


 


AST    





   


 


ALT    





   


 


Alkaline Phosphatase    





   


 


Total Protein    





   


 


Albumin    





   


 


Lipase    





   


 


Urine Color    





   


 


Urine Appearance    





   


 


Urine pH    





   


 


Ur Specific Gravity    





   


 


Urine Protein    





   


 


Urine Ketones    





   


 


Urine Blood    





   


 


Urine Nitrate    





   


 


Urine Bilirubin    





   


 


Urine Urobilinogen    





   


 


Ur Leukocyte Esterase    





   


 


Urine RBC    





   


 


Urine WBC    





   


 


Ur Epithelial Cells    





   


 


Urine Mucus    





   


 


Urine Glucose    





   


 


Ethyl Alcohol    





   











Medications Given: 


 








Discontinued Medications





Sodium Chloride (Ns)  1,000 mls @ 0 mls/hr IV EDNOW ONE; Wide Open


   PRN Reason: Protocol


   Stop: 02/05/19 12:39


   Last Admin: 02/05/19 12:48 Dose:  1,000 mls


Sodium Chloride (Ns)  1,000 mls @ 0 mls/hr IV EDNOW ONE; Wide Open


   PRN Reason: Protocol


   Stop: 02/05/19 14:04


   Last Admin: 02/05/19 14:51 Dose:  Not Given








Departure





- Departure


Disposition: Home, Routine, Self-Care


Clinical Impression: 


 Back spasm





Condition: Good


Instructions:  Muscle Spasm (ED)


Additional Instructions: 


DISCHARGE INSTRUCTIONS FROM YOUR DOCTOR 


Thank you for visiting our emergency department today. Please keep in mind that 

discharge from the emergency department does not mean that there is nothing 

wrong - it simply means that we have not identified an emergency condition that 

requires further evaluation or treatment in the hospital. You should always 

plan to follow up with primary care for re-evaluation of your condition in the 

next 2-3 days. If you have been referred to a specialist, please call as soon 

as possible (today or tomorrow) to schedule your follow up appointment at the 

appropriate time. 





 EVALUATION IN THE EMERGENCY DEPARTMENT TODAY INCLUDED CT SCAN OF THE HEAD AND 

FACE SECONDARY TO RECENT TRAUMA, AND LAB EVALUATION.  CT SCANS ARE REASSURING 

WITH NO ACUTE FRACTURE OR BLEEDING IN THE BRAIN.  YOU DO APPEAR DEHYDRATED, YOU 

HAVE ELEVATED KIDNEY FUNCTIONS AND WE RECOMMENDED REPEAT IV FLUIDS WHICH HE 

DECLINED.  PLEASE INCREASE WATER INTAKE.  RECTAL EXAM AND EVALUATION FOR RECTAL 

BLEEDING WAS OFFERED, YOU HAVE DECLINED.  PLEASE KEEP APPOINTMENTS NEXT WEEK 

FOR COLONOSCOPY.  CALL [facility] IF HE WISHED RESCHEDULE THIS.  RETURN TO 

THE EMERGENCY DEPARTMENT FOR WORSENING BACK PAIN, ABDOMINAL PAIN, VOMITING, 

TROUBLE URINATING, RECTAL BLEEDING, FEVER, OR ANY OTHER CONCERN.





People present with illnesses and injuries in different ways, and it is always 

possible that we have missed something. You may always return for re-evaluation 

if symptoms worsen or if they are not improving or if you develop new/different 

symptoms. 


Again, thank you for choosing our emergency department. We hope that you feel 

better.


Referrals: 


Patient,NotPresent [Unknown] - As per Instructions


Caren Knox [Primary Care Provider] - 1-2 days without fail

## 2019-02-05 NOTE — ASMTCMCOM
CM Note

 

CM Note                       

Notes:

PT in FED for back spasms. Per attending RN, pt reported daily ETOH use with current intoxication 

and stated that  he was rude but manageable  Pt has DX of  metastatic squamous cell carcinoma and 

HX of numerous falls and injuries with  pain. DC plans TBD CM to follow.   

 

Date Signed:  02/05/2019 12:56 PM

Electronically Signed By:Tone Mcghee LCSW

## 2019-02-14 NOTE — PDGENHP
History and Physical





- Chief Complaint


needs colonoscopy





- History of Present Illness


57 yo male with h/o htn, GERD and etoh dependence who lives in a storage unit, 

presents to hospital for direct admission for planned colonoscopy in am.  This 

was arranged by [facility] as they do not think he can be compliant with 

outpt prep and procedure.  He has a h/o anal cancer, followed by Dr. Gómez.  

He was treated in 2017 with chemo and radiation therapy.  He seems intoxicated 

today and is tangential, not able to provide a dependable history.  States he 

wishes to be called "bat man".   





History Information





- Allergies/Home Medication List


Allergies/Adverse Reactions: 








Penicillins Allergy (Intermediate, Verified 05/14/18 16:33)


 Rash





Home Medications: 








Lidocaine 5% [Lidoderm 5% Patch] 1 ea TD DAILY PRN 08/11/15 [Last Taken 10/23/17

]


Terazosin HCl [Hytrin 2 MG (*)] 2 mg PO HS 08/11/15 [Last Taken 10/22/17]


traZODone [traZODONE 100MG (*)] 200 mg PO HS 08/11/15 [Last Taken 10/22/17]


Baclofen [Baclofen 10 mg (*)] 10 mg PO BID 10/23/17 [Last Taken 10/22/17]


Fluticasone Nasal [Flonase Nasal Spray] 2 sprays NASAL DAILY 10/23/17 [Last 

Taken 10/23/17]


Gabapentin [Neurontin 400 MG (*)] 800 mg PO TID 10/23/17 [Last Taken 10/23/17]


Omeprazole [Prilosec 20 mg] 40 mg PO DAILY 10/23/17 [Last Taken 10/23/17]


Herbals/Supplements -Info Only 1 each PO DAILY 02/12/19 [Last Taken Unknown]


Lisinopril [Zestril 20 mg (*)] 20 mg PO DAILY 02/12/19 [Last Taken Unknown]


Spironolactone [Aldactone 50 MG (RX)] 50 mg PO DAILY 02/12/19 [Last Taken 

Unknown]


Vitamin B Complex [Vitamin B Complex (OTC)] 1 each PO DAILY 02/12/19 [Last 

Taken Unknown]


oxyCODONE IR [Oxycodone Ir (*)] 5 mg PO TID PRN 02/12/19 [Last Taken Unknown]


traMADol [Ultram 50 mg (*)] 50 mg PO Q4 02/12/19 [Last Taken Unknown]





I have personally reviewed and updated: family history, medical history, social 

history, surgical history





- Past Medical History


cancer, hypertension


Additional medical history: alcohol abuse, tobacco abuse, hypertension, 

neuropathy, traumatic ICH 2009, insomnia.  anal cancer





- Surgical History


Additional surgical history: right shoulder surgery 1986.  metal plate right 

finger





- Family History


Positive for: non-pertinent





- Social History


Smoking Status: Current every day smoker


Additional social history: Homeless, has lived in a storage unit.  Drinks pint 

of [employer] a day.  Denies w/d or seizure hx.  Wishes to be called 

[facility]





Review of Systems


Review of Systems: 





ROS: 10pt was reviewed & negative except for what was stated in HPI & below





Physical Exam


Physical Exam: 





Constitutional: no apparent distress


Eyes: PERRL


Ears, Nose, Mouth, Throat: moist mucous membranes


Cardiovascular: regular rate and rhythym


Respiratory: no respiratory distress, clear to auscultation


Gastrointestinal: normoactive bowel sounds, soft, non-tender abdomen


Skin: warm


Musculoskeletal: full muscle strength


Neurologic: AAOx3


Psychiatric: interacting appropriately





Assessment & Plan


Assessment: 





Anal cancer - admission for repeat c-scope in am, unable to do this as outpt 

with psychosocial issues.  \   


   -check cbc, bmp, INR


   -bowel prep tonight


   -Dr. Sidhu aware of admission, c-scope in am\





GERD - cont PPI





Hypertension - cont home meds





Etoh abuse - he appears intoxicated


   -check serum etoh level


   -offered pt alcohol to prevent w/d as he has previously not been interested 

in detox, but he declines alcohol


   -CIWA protocol given he does not want etoh





Full code





Dispo - obs, possible dc in am after c-scope if no complications

## 2019-02-15 NOTE — ASMTLACE
LACE

 

Length of stay for            Answers:  1 day                                 

current admission                                                             

Acuity / Level of             Answers:  No                                    

Care: Did the patient                                                         

have an inpatient                                                             

admission?                                                                    

Comorbidities - select        Answers:  Any tumor (including                  

all that apply                          lymphoma or leukemia)                 

                                        Opioid dependence                     

                                        / Chronic pain                        

                                        Other                         Notes:  GERD; HTN

# of Emergency department     Answers:  1-2                                   

visits in the last 6                                                          

months                                                                        

Social determinants           Answers:  History of substance                  

                                        abuse (ETOH, street                   

                                        drugs, prescription                   

                                        drugs, etc.)                          

Score: 12

 

Date Signed:  02/15/2019 03:37 PM

Electronically Signed By:Yesenia Dc RN

## 2019-02-15 NOTE — PDDCSUM
Discharge Summary


Discharge Summary: 





Discharge diagnosis


Alcohol abuse


GERD


Hypertension


History of anal cancer








Patient is a 58-year-old male with past medical history hypertension, GERD, 

alcohol dependence, anal cancer, who lives in a storage unit presented to the 

hospital for direct admission for planned colonoscopy in the morning.  If 

colonoscopy was arranged by [facility] as there was concern that the 

patient would be noncompliant with his colonoscopy prep.  On admission the 

patient was obviously intoxicated, his blood alcohol level was 275. He was 

given a GI prep which she only partially completed.  He was taken for an upper 

endoscopy and colonoscopy this morning.  Colonoscopy was unremarkable with no 

abnormal findings.  Upper endoscopy was notable for a esophageal stricture at 

37 cm from the incisors.  The stenosis was mildly severe and measured 1.2 cm by 

1 cm.  Dilation was performed with a Savary dilator.  He had no complications 

and was able to tolerate a regular diet shortly after his procedure.  He was 

discharged home on his usual medications which included a PPI.





Discharge disposition


Home in good condition





Medications


Unchanged

## 2019-02-15 NOTE — PDGENHP
History & Physical


Chief Complaint: GERD, Dysphagia, Melena


Pertinent Past, Social, Family History: 59 yo male with alcoholism and chronic 

heartburn. Chronic dysphagia with prior EGD showing mild distal esophageal 

stricture. History of recent melena and prior anal cancer.  SH: Active alcohol 

and tobacco use.  FH: negative for colon cancer.


Relevant Physical Exam: NAD.  CTA B/L.  RRR without m/r/g.  GI soft. NABS. NT/ND


Cardiorespiratory Assessment: EGD.  Colonoscopy.  MAC with IV propofol.  ASA III

## 2019-02-15 NOTE — GIREPORT
[facility]

Surgical Services - Endoscopy Department

_____________________________________________________________________________________________________
_______

Patient Name: William Mentzer                        Procedure Date: 2/15/2019 12:03 PM

MRN: [id number]                                       Account Number: [id number]

Patient Type: Inpatient                              Attending  MD/ ER Physician: Stew Sidhu MD

_____________________________________________________________________________________________________
_______

 

Procedure:                    Colonoscopy

Indications:                  Screening for colorectal malignant neoplasm

Providers:                    Stew Sidhu MD

Medicines:                    Propofol per Anesthesia

Complications:                No immediate complications.

Description of Procedure:     After obtaining informed consent, the scope was passed under direct vis
ion. 

                              Throughout the procedure, the patient's blood pressure, pulse, and oxyg
en 

                              saturations were monitored continuously. The Colonoscope with irrigatio
n 

                              channel was introduced through the anus and advanced to the cecum, 

                              identified by appendiceal orifice and ileocecal valve. The colonoscopy 
was 

                              performed without difficulty. The patient tolerated the procedure well.
 The 

                              quality of the bowel preparation was good. The ileocecal valve, appendi
ceal 

                              orifice, and rectum were photographed.

Findings:                     The perianal and digital rectal examinations were normal. Pertinent 

                              negatives include normal sphincter tone and no palpable rectal lesions.


                              A few small-mouthed diverticula were found in the sigmoid colon.

                              The exam was otherwise without abnormality.

Estimated Blood Loss:         Estimated blood loss: none.

Post Op Diagnosis:            - Diverticulosis in the sigmoid colon.

                              - The examination was otherwise normal.

                              - No specimens collected.

Recommendation:               - Repeat colonoscopy in 10 years for screening purposes.

                              - Resume previous diet.

                              - Continue present medications.

                              - Patient has a contact number available for emergencies. The signs and
 

                              symptoms of potential delayed complications were discussed with the pat
ient. 

                              Return to normal activities tomorrow. Written discharge instructions we
re 

                              provided to the patient.

                              - Thank you for allowing me to be involved in the care of your patient.


Attending Participation:

     I personally performed the entire procedure without the assistance of a fellow, resident or surg
ical 

     assistant.

 

Stew Sidhu MD

_______________

Stew Sidhu MD

2/15/2019 1:11:52 PM

This report has been signed electronicallyDavid MD Nahum

Number of Addenda: 0

 

Note Initiated On: 2/15/2019 12:03 PM

Total Procedure Duration Time 0 hours 12 minutes 16 seconds 

[email or web address]

## 2019-02-15 NOTE — GIREPORT
[facility]

Surgical Services - Endoscopy Department

_____________________________________________________________________________________________________
_______

Patient Name: William Mentzer                        Procedure Date: 2/15/2019 12:01 PM

MRN: [id number]                                       Account Number: [id number]

Patient Type: Inpatient                              Attending  MD/ ER Physician: Stew Sidhu MD

_____________________________________________________________________________________________________
_______

 

Procedure:                    Upper GI endoscopy

Indications:                  Dysphagia, Heartburn

Providers:                    Stew Sidhu MD

Medicines:                    Propofol per Anesthesia

Complications:                No immediate complications.

Description of Procedure:     After obtaining informed consent, the endoscope was passed under direct
 

                              vision. Throughout the procedure, the patient's blood pressure, pulse, 
and 

                              oxygen saturations were monitored continuously. The Endoscope was intro
duced 

                              through the mouth, and advanced to the second part of duodenum. The [facility]
er GI 

                              endoscopy was accomplished without difficulty. The patient tolerated th
e 

                              procedure well.

Findings:                     One benign-appearing, intrinsic stenosis was found 37 cm from the incis
ors. 

                              This stenosis was mildly severe and measured 1.2 cm (inner diameter) x 
1 cm 

                              (in length). The stenosis was traversed. A guidewire was placed and the
 

                              scope was withdrawn. Dilation was performed with a Savary dilator with 
mild 

                              resistance at 54 Fr.

                              A small hiatal hernia was present.

                              The examined duodenum was normal.

Estimated Blood Loss:         Estimated blood loss: none.

Post Op Diagnosis:            - Benign-appearing esophageal stenosis. Dilated.

                              - Small hiatal hernia.

                              - Normal examined duodenum.

                              - No specimens collected.

Recommendation:               - Use Prilosec (omeprazole) 20 mg PO daily.

                              - Advance diet as tolerated.

                              - Perform a colonoscopy today.

                              - [facility] to [facility].

                              - Thank you for allowing me to be involved in the care of your patient.


Attending Participation:

     I personally performed the entire procedure without the assistance of a fellow, resident or surg
ical 

     assistant.

 

Stew Sidhu MD

_______________

Stew Sidhu MD

2/15/2019 1:12:40 PM

This report has been signed electronicallyDavid MD Nahum

Number of Addenda: 0

 

Note Initiated On: 2/15/2019 12:01 PM

[email or web address]

## 2019-02-15 NOTE — PDANEPAE
ANE Past Medical History





- Cardiovascular History


Hx Hypertension: Yes


Hx Arrhythmias: No


Hx Chest Pain: No


Hx Coronary Artery / Peripheral Vascular Disease: No


Hx CHF / Valvular Disease: No


Hx Palpitations: No





- Pulmonary History


Hx COPD: No


Hx Asthma/Reactive Airway Disease: No


Hx Recent Upper Respiratory Infection: No


Hx Oxygen in Use at Home: No


Hx Sleep Apnea: No


Sleep Apnea Screening Result - Last Documented: Negative





- Neurologic History


Hx Cerebrovascular Accident: No


Hx Seizures: No


Hx Dementia: No





- Endocrine History


Hx Diabetes: No





- Renal History


Hx Renal Disorders: No





- Liver History


Hx Hepatic Disorders: No


Hepatic History Comment: ENLARGED LIVER





- Neurological & Psychiatric Hx


Hx Neurological and Psychiatric Disorders: No


Neurological / Psychiatric History Comment: TREMOR





- Cancer History


Hx Cancer: No


Cancer History Comment: ANAL CA DEC 2017





- Congenital Disorder History


Hx Congenital Disorders: No





- GI History


Hx Gastrointestinal Disorders: Yes


Gastrointestinal History Comment: food gets stuck in throat, has to throw it , 

medications get stuck





- Other Health History


Other Health History: HEADACHE FROM LAST CONCUSSION.  Drinks 1 pint / whiskey





- Chronic Pain History


Chronic Pain: Yes





- Surgical History


Prior Surgeries: nylon ligaments right shoulder





ANE Review of Systems


Review of Systems: 








- Exercise capacity


METS (RN): 4 METS





ANE Patient History





- Allergies


Allergies/Adverse Reactions: 








Penicillins Allergy (Intermediate, Verified 05/14/18 16:33)


 Rash


diclofenac Allergy (Unknown, Verified 02/15/19 10:04)


 








- Home Medications


Home Medications: 








Lidocaine 5% [Lidoderm 5% Patch] 1 ea TD DAILY PRN 08/11/15 [Last Taken 10/23/17

]


Terazosin HCl [Hytrin 2 MG (*)] 2 mg PO HS 08/11/15 [Last Taken 10/22/17]


traZODone [traZODONE 100MG (*)] 200 mg PO HS 08/11/15 [Last Taken 10/22/17]


Baclofen [Baclofen 10 mg (*)] 10 mg PO BID 10/23/17 [Last Taken 10/22/17]


Fluticasone Nasal [Flonase Nasal Spray] 2 sprays NASAL DAILY 10/23/17 [Last 

Taken 10/23/17]


Gabapentin [Neurontin 400 MG (*)] 800 mg PO TID 10/23/17 [Last Taken 10/23/17]


Omeprazole [Prilosec 20 mg] 40 mg PO DAILY 10/23/17 [Last Taken 10/23/17]


Herbals/Supplements -Info Only 1 each PO DAILY 02/12/19 [Last Taken Unknown]


Lisinopril [Zestril 20 mg (*)] 20 mg PO DAILY 02/12/19 [Last Taken Unknown]


Spironolactone [Aldactone 50 MG (RX)] 50 mg PO DAILY 02/12/19 [Last Taken 

Unknown]


Vitamin B Complex [Vitamin B Complex (OTC)] 1 each PO DAILY 02/12/19 [Last 

Taken Unknown]


oxyCODONE IR [Oxycodone Ir (*)] 5 mg PO TID PRN 02/12/19 [Last Taken Unknown]


traMADol [Ultram 50 mg (*)] 50 mg PO Q4 02/12/19 [Last Taken Unknown]








- Smoking Hx


Smoking Status: Current every day smoker





- Family Anes Hx


Family Hx Anesthesia Complications: no





ANE Labs/Vital Signs





- Labs


Result Diagrams: 


 02/14/19 16:10





 02/15/19 10:30





- Vital Signs


Blood Pressure: 142/95


Heart Rate: 94


Respiratory Rate: 18


O2 Sat (%): 91


Height: 171 cm


Weight: 74.6 kg





ANE Physical Exam





- Airway


Neck exam: FROM


Mallampati Score: Class 3


Mouth exam: poor dentition





- Pulmonary


Pulmonary: clear to auscultation





- Cardiovascular


Cardiovascular: regular rate and rhythym





- ASA Status


ASA Status: III





ANE Anesthesia Plan


Anesthesia Plan: MAC

## 2019-11-27 NOTE — GCON
[f rep st]



                                                                    CONSULTATION





ONCOLOGY CONSULTATION



DATE OF CONSULTATION:  10/24/2017



REASON FOR CONSULTATION:  Anal squamous cell carcinoma.



HISTORY OF PRESENT ILLNESS:  The patient is a 57-year-old gentleman who is followed by my partner, Dr Chayito Gómez.  The patient reports noting a "lump" in his right groin in July of this year.  The 
patient is homeless and was briefly incarcerated and, thus, had significant barriers to receiving med
ical care and treatment.  He was eventually evaluated.  A biopsy revealed anal squamous cell carcinom
a.  A subsequent endoscopy confirmed squamous cell carcinoma in the anal canal.  A PET scan showed no
 evidence of metastatic disease.  The patient has been admitted for his first cycle of mitomycin 5-FU
, which is being given concurrently with external beam radiation therapy.  He received his first radi
ation treatment yesterday. 



He denies any anorectal pain.  He denies chest pain, cough, or exertional dyspnea.  He denies abdomin
al pain or bloating. 



His medical history is notable for alcohol abuse.  He is admitted to begin chemotherapy.  He is sched
uled to have a PICC line placed later today and hopefully to begin his mitomycin 5-FU this evening.



PAST MEDICAL HISTORY:  

1.  Alcohol abuse.

2.  Hypertension.

3.  Peripheral neuropathy.

4.  Traumatic intracerebral hemorrhage, .

5.  Chronic insomnia.



PAST SURGICAL HISTORY:  Right shoulder surgery, .



FAMILY HISTORY:  The patient reports his older brother recently  of metastatic prostate cancer.  
He has a brother who had a possible head and neck cancer.  A sister had early stage uterine cancer.



SOCIAL HISTORY:  The patient is homeless.  He resides in [facility].  He is not working.  He drin
ks approximately 1 pint of [place] whiskey a day.  He smokes 12-13 cigarettes daily.  He has family 
living in [place], [place] (siblings).



REVIEW OF SYSTEMS:  As outlined above.



PHYSICAL EXAM:  GENERAL:  The patient is in no acute distress.  HEENT:  Pupils equal.  Sclerae are no
nicteric.  Conjunctivae normal.  No palpable submandibular, cervical, or supraclavicular adenopathy. 
 HEART:  Regular, without murmur.  LUNGS:  Clear bilaterally.  No wheeze, no rhonchi.  No crackles.  
No flank tenderness.  ABDOMEN:  Soft, nontender, nondistended.  There is no hepatomegaly.  LYMPHATIC:
  The patient does have a palpable 2-3 cm mobile node in the right inguinal region.  No palpable left
 inguinal adenopathy.  EXTREMITIES:  No extremity swelling or edema.



LABORATORY STUDIES:  White count of 4.05, hemoglobin 14.4, platelet count 128,000. Absolute neutrophi
l count is 1200.  Sodium 141, potassium 3.3, chloride 102, bicarb 26, BUN 8, creatinine 0.8, total bi
lirubin 0.5, AST 88, ALT 88, alkaline phosphatase 62, albumin 4.2.



IMPRESSION:  

1.  Locally advanced anal squamous cell carcinoma (patient being admitted for his first cycle of sunny
mycin 5-FU therapy).

2.  Alcohol abuse.

3.  Mild transaminitis, likely secondary to #2. 



The patient is a 57-year-old gentleman who is electively admitted for his first cycle of mitomycin 5-
FU.  He is scheduled to have a PICC line placed later today and will hopefully begin his mitomycin 5-
FU this evening.  I again reviewed the regimen.  We discussed the intent of treatment.  The patient h
as a potentially curable malignancy.  The side effect profile of mitomycin 5-FU was again reviewed wi
th the patient, including the risk of neutropenic infection and chemotherapy-induced mucositis, nause
a, vomiting, and diarrhea.  He is willing to proceed with therapy. 



He will remain inpatient for at least 4 days to receive his treatment.  The 5-FU will be given by con
tinuous infusion. 



His LFTs will be monitored closely.  I suspect the mild elevation is due to his ongoing alcohol abuse
.  He does not require a chemotherapy dose adjustment based on his liver function tests currently. 



Case Management is involved.  His living situation presents a problem given that he is receiving rashi
ow suppressive chemotherapy.  Case Management is meeting with him to attempt to find a temporary hous
ing situation for the patient. 



Our service will continue to follow him during his hospital stay.  He will continue to receive daily 
radiation therapy. 



His questions were answered today. 



Total time for today's visit was approximately 45 minutes, of which greater than 50% was spent in cou
nseling and care coordination.  His case was discussed with [facility] [place] nursing staff.





Job #:  [id number]
full weight-bearing

## 2022-08-22 NOTE — ASMTCMCOM
CM Note

 

CM Note                       

Notes:

Pt currently does not meet criteria for a LTC bed at a nursing facility. Dir Helen of case 

mgmt. approved pt staying at the [facility] for two weeks. This plan will be revisited near the 

end of those two weeks. Reservation for [facility] from 10/29  11/12. Reservation # is 

[phone]. Pt will also need W/C transport through [facility] set up. This will be covered by 

Medicaid. [employer] will be contacted tomorrow. Discussed plans with pt who is grateful. Went 


over rule about no smoking in room with pt. Discussed rule about limiting visitors and taking good 

care of the room but pt indicated he has no friends so doesnt think it will be an issue. Pt is due 

to complete his chemo on Sunday so DC tentatively planned for then.

 

Date Signed:  10/26/2017 05:09 PM

Electronically Signed By:Elana De La Cruz LCSW See AHRF